# Patient Record
Sex: FEMALE | Race: ASIAN | NOT HISPANIC OR LATINO | Employment: FULL TIME | URBAN - METROPOLITAN AREA
[De-identification: names, ages, dates, MRNs, and addresses within clinical notes are randomized per-mention and may not be internally consistent; named-entity substitution may affect disease eponyms.]

---

## 2017-02-15 ENCOUNTER — TELEPHONE (OUTPATIENT)
Dept: FAMILY MEDICINE | Facility: CLINIC | Age: 37
End: 2017-02-15

## 2017-02-15 NOTE — TELEPHONE ENCOUNTER
Panel Management Review        Composite cancer screening  Chart review shows that this patient is due/due soon for the following Pap Smear  Summary:    Patient is due/failing the following:   PAP and PHYSICAL    Action needed:   Patient needs office visit for Physical.    Type of outreach:    Phone, spoke to patient.  Pt was made an appt for physical on 2/17/2017    Questions for provider review:    None                                                                                                                                    Ally, CMA

## 2017-03-31 ENCOUNTER — TELEPHONE (OUTPATIENT)
Dept: FAMILY MEDICINE | Facility: CLINIC | Age: 37
End: 2017-03-31

## 2017-03-31 ASSESSMENT — ANXIETY QUESTIONNAIRES
1. FEELING NERVOUS, ANXIOUS, OR ON EDGE: NEARLY EVERY DAY
7. FEELING AFRAID AS IF SOMETHING AWFUL MIGHT HAPPEN: NEARLY EVERY DAY
5. BEING SO RESTLESS THAT IT IS HARD TO SIT STILL: MORE THAN HALF THE DAYS
6. BECOMING EASILY ANNOYED OR IRRITABLE: MORE THAN HALF THE DAYS
2. NOT BEING ABLE TO STOP OR CONTROL WORRYING: MORE THAN HALF THE DAYS
3. WORRYING TOO MUCH ABOUT DIFFERENT THINGS: MORE THAN HALF THE DAYS
IF YOU CHECKED OFF ANY PROBLEMS ON THIS QUESTIONNAIRE, HOW DIFFICULT HAVE THESE PROBLEMS MADE IT FOR YOU TO DO YOUR WORK, TAKE CARE OF THINGS AT HOME, OR GET ALONG WITH OTHER PEOPLE: VERY DIFFICULT
GAD7 TOTAL SCORE: 16

## 2017-03-31 ASSESSMENT — PATIENT HEALTH QUESTIONNAIRE - PHQ9: 5. POOR APPETITE OR OVEREATING: MORE THAN HALF THE DAYS

## 2017-03-31 NOTE — TELEPHONE ENCOUNTER
Panel Management Review          Fail List measure:     Depression / Dysthymia review  PHQ-9 SCORE 10/21/2016 3/31/2017   Total Score 18 10      Patient is due for:  PHQ9      Patient is due/failing the following:   PHQ9    Action needed:   Patient needs to do PHQ9.    Type of outreach:    PHQ-9: 10  GAGE: 16  Questions for provider review:    None                                                                                                                                    Lisa Camarena CMA      Chart routed to Provider .

## 2017-04-01 ASSESSMENT — ANXIETY QUESTIONNAIRES: GAD7 TOTAL SCORE: 16

## 2017-04-01 ASSESSMENT — PATIENT HEALTH QUESTIONNAIRE - PHQ9: SUM OF ALL RESPONSES TO PHQ QUESTIONS 1-9: 10

## 2017-04-10 ENCOUNTER — OFFICE VISIT (OUTPATIENT)
Dept: FAMILY MEDICINE | Facility: CLINIC | Age: 37
End: 2017-04-10
Payer: COMMERCIAL

## 2017-04-10 VITALS
HEIGHT: 59 IN | DIASTOLIC BLOOD PRESSURE: 61 MMHG | HEART RATE: 52 BPM | TEMPERATURE: 98.6 F | WEIGHT: 110.2 LBS | BODY MASS INDEX: 22.22 KG/M2 | OXYGEN SATURATION: 99 % | SYSTOLIC BLOOD PRESSURE: 88 MMHG

## 2017-04-10 DIAGNOSIS — Z00.00 NORMAL PHYSICAL EXAM: Primary | ICD-10-CM

## 2017-04-10 DIAGNOSIS — G43.009 MIGRAINE WITHOUT AURA AND WITHOUT STATUS MIGRAINOSUS, NOT INTRACTABLE: ICD-10-CM

## 2017-04-10 DIAGNOSIS — Z12.4 SCREENING FOR MALIGNANT NEOPLASM OF CERVIX: ICD-10-CM

## 2017-04-10 DIAGNOSIS — Z11.3 SCREEN FOR STD (SEXUALLY TRANSMITTED DISEASE): ICD-10-CM

## 2017-04-10 DIAGNOSIS — F33.1 MAJOR DEPRESSIVE DISORDER, RECURRENT EPISODE, MODERATE (H): ICD-10-CM

## 2017-04-10 LAB
MICRO REPORT STATUS: NORMAL
SPECIMEN SOURCE: NORMAL
WET PREP SPEC: NORMAL

## 2017-04-10 PROCEDURE — 87210 SMEAR WET MOUNT SALINE/INK: CPT | Performed by: NURSE PRACTITIONER

## 2017-04-10 PROCEDURE — G0145 SCR C/V CYTO,THINLAYER,RESCR: HCPCS | Performed by: NURSE PRACTITIONER

## 2017-04-10 PROCEDURE — 87591 N.GONORRHOEAE DNA AMP PROB: CPT | Performed by: NURSE PRACTITIONER

## 2017-04-10 PROCEDURE — 99395 PREV VISIT EST AGE 18-39: CPT | Performed by: NURSE PRACTITIONER

## 2017-04-10 PROCEDURE — 99213 OFFICE O/P EST LOW 20 MIN: CPT | Mod: 25 | Performed by: NURSE PRACTITIONER

## 2017-04-10 PROCEDURE — 87624 HPV HI-RISK TYP POOLED RSLT: CPT | Performed by: NURSE PRACTITIONER

## 2017-04-10 PROCEDURE — 87491 CHLMYD TRACH DNA AMP PROBE: CPT | Performed by: NURSE PRACTITIONER

## 2017-04-10 ASSESSMENT — ANXIETY QUESTIONNAIRES
1. FEELING NERVOUS, ANXIOUS, OR ON EDGE: SEVERAL DAYS
3. WORRYING TOO MUCH ABOUT DIFFERENT THINGS: MORE THAN HALF THE DAYS
5. BEING SO RESTLESS THAT IT IS HARD TO SIT STILL: SEVERAL DAYS
6. BECOMING EASILY ANNOYED OR IRRITABLE: SEVERAL DAYS
7. FEELING AFRAID AS IF SOMETHING AWFUL MIGHT HAPPEN: SEVERAL DAYS
GAD7 TOTAL SCORE: 10
IF YOU CHECKED OFF ANY PROBLEMS ON THIS QUESTIONNAIRE, HOW DIFFICULT HAVE THESE PROBLEMS MADE IT FOR YOU TO DO YOUR WORK, TAKE CARE OF THINGS AT HOME, OR GET ALONG WITH OTHER PEOPLE: VERY DIFFICULT
2. NOT BEING ABLE TO STOP OR CONTROL WORRYING: MORE THAN HALF THE DAYS

## 2017-04-10 ASSESSMENT — PATIENT HEALTH QUESTIONNAIRE - PHQ9: 5. POOR APPETITE OR OVEREATING: MORE THAN HALF THE DAYS

## 2017-04-10 NOTE — LETTER
April 18, 2017    Kevin Crowe  [address] [facility] [address]  [place] [address]    Dear Kevin,  We are happy to inform you that your PAP smear result from 4/10/17 is normal.  We are now able to do a follow up test on PAP smears. The DNA test is for HPV (Human Papilloma Virus). Cervical cancer is closely linked with certain types of HPV. Your result showed no evidence of high risk HPV.  Therefore we recommend you return in 5 years for your next pap smear and HPV test.  You will still need to return to the clinic every year for an annual exam and other preventive tests.  Please contact the clinic at [phone] with any questions.  Sincerely,    SENIA Miller CNP/rlm

## 2017-04-10 NOTE — LETTER
[facility]   [address]  [facility] [address]      April 10, 2017      Kevin Crowe  [address]  [facility] [address]            Dear Kevin,    Your wet prep did not show a vaginal infection.  I'll let you know your other results as they become available.    Feel free to contact me with any questions or concerns.  Thank you for allowing me to participate in your care.    Pati Aguila APRN, CNP/ak        Results for orders placed or performed in visit on 04/10/17   Wet prep   Result Value Ref Range    Specimen Description Vagina     Wet Prep       No Trichomonas seen  No clue cells seen  No yeast seen  Scant material seen on sample submitted      Micro Report Status FINAL 04/10/2017

## 2017-04-10 NOTE — MR AVS SNAPSHOT
After Visit Summary   4/10/2017    Kevin Crowe    MRN: [id number]           Patient Information     Date Of Birth          1980        Visit Information        Provider Department      4/10/2017 9:40 AM Pati Aguila APRN CNP [facility]        Today's Diagnoses     Normal physical exam    -  1    Screening for malignant neoplasm of cervix        Migraine without aura and without status migrainosus, not intractable        Major depressive disorder, recurrent episode, moderate (H)        Screen for STD (sexually transmitted disease)          Care Instructions    Based on your medical history and these are the current health maintenance or preventive care services that you are due for (some may have been done at this visit)  Health Maintenance Due   Topic Date Due     PAP SCREENING Q3 YR (SYSTEM ASSIGNED)  09/09/1999         At [facility], we strive to deliver an exceptional experience to you, every time we see you.    If you receive a survey in the mail, please send us back your thoughts. We really do value your feedback.    Your care team's suggested websites for health information:  Www.GetYourGuide.org : Up to date and easily searchable information on multiple topics.  Www.medlineplus.gov : medication info, interactive tutorials, watch real surgeries online  Www.familydoctor.org : good info from the Academy of Family Physicians  Www.cdc.gov : public health info, travel advisories, epidemics (H1N1)  Www.aap.org : children's health info, normal development, vaccinations  Www.health.state.[place].us : [place] dept of health, public health issues in [place], N1N1    How to contact your care team:   Team Alexa/Spirit [phone]         Pharmacy [phone]    Dr. Chahal, Pushpa Parker PA-C, Dr. Mccrary, Anjana CONN CNP, Maryam Allen PA-C, Dr. Sainz, and SENIA Parikh CNP    Team RNs: Orquidea Leavitt      Clinic hours  M-Th 7 am-7 pm   Fri 7  am-5 pm.   Urgent care M-F 11 am-9 pm,   Sat/Sun 9 am-5 pm.  Pharmacy M-Th 8 am-8 pm Fri 8 am-6 pm  Sat/Sun 9 am-5 pm.     All password changes, disabled accounts, or ID changes in [email or web address] will be done by our Access Services Department.    If you need help with your account or password, call: [phone]. Clinic staff no longer has the ability to change passwords.     Preventive Health Recommendations  Female Ages 26 - 39  Yearly exam:   See your health care provider every year in order to    Review health changes.     Discuss preventive care.      Review your medicines if you your doctor has prescribed any.    Until age 30: Get a Pap test every three years (more often if you have had an abnormal result).    After age 30: Talk to your doctor about whether you should have a Pap test every 3 years or have a Pap test with HPV screening every 5 years.   You do not need a Pap test if your uterus was removed (hysterectomy) and you have not had cancer.  You should be tested each year for STDs (sexually transmitted diseases), if you're at risk.   Talk to your provider about how often to have your cholesterol checked.  If you are at risk for diabetes, you should have a diabetes test (fasting glucose).  Shots: Get a flu shot each year. Get a tetanus shot every 10 years.   Nutrition:     Eat at least 5 servings of fruits and vegetables each day.    Eat whole-grain bread, whole-wheat pasta and brown rice instead of white grains and rice.    Talk to your provider about Calcium and Vitamin D.     Lifestyle    Exercise at least 150 minutes a week (30 minutes a day, 5 days of the week). This will help you control your weight and prevent disease.    Limit alcohol to one drink per day.    No smoking.     Wear sunscreen to prevent skin cancer.    See your dentist every six months for an exam and cleaning.    Depression: Tips to Help Yourself  As your health care providers help treat your depression, you can also help  yourself. Keep in mind that your illness affects you emotionally, physically, mentally, and socially. So full recovery will take time. Take care of your body and your soul, and be patient with yourself as you get better.    Be with others  Don t isolate yourself--you ll only feel worse. Try to be with other people. And take part in fun activities when you can. Go to a movie, ballgame, Christian service, or social event. Talk openly with people you can trust. And accept help when it s offered.  Keep your perspective    Depression can cloud your judgment. So wait until you feel better before making major life decisions, such as changing jobs, moving, or getting  or .    This illness is not your fault. Don t blame yourself for your depression.    Recovering from depression is a process. Don t be discouraged if it takes some time to feel better.    Depression saps your energy and concentration. So you won t be able to do all the things you used to do. Set small goals and do what you can.  Take care of your body  People with depression often lose the desire to take care of themselves. That only makes their problems worse. During treatment and afterward, make a point to:    Exercise. It s a great way to take care of your body. And studies have shown that exercise helps fight depression.    Avoid drugs and alcohol. These may ease the pain in the short term. But they ll only make your problems worse in the long run.    Get relief from stress. Ask your healthcare provider for relaxation exercises and techniques to help relieve stress.    Eat right. A balanced and healthy diet helps keep your body healthy.    1375-0478 [employer]. [address] [place] [address], [place] [address]. All rights reserved. This information is not intended as a substitute for professional medical care. Always follow your healthcare professional's instructions.              Follow-ups after your visit        Additional Services  "    MENTAL HEALTH REFERRAL       Your provider has referred you to: [facility]: [facility] - Counseling (Individual/Couples/Family) - [facility] Lindsay Velez [phone]   [email or web address]   *Patient will be contacted by [facility] scheduling partner[facility]), to schedule an appointment.  Patients may also call [facility] to schedule.    All scheduling is subject to the client's specific insurance plan & benefits, provider/location availability, and provider clinical specialities.  Please arrive 15 minutes early for your first appointment and bring your completed paperwork.    Please be aware that coverage of these services is subject to the terms and limitations of your health insurance plan.  Call member services at your health plan with any benefit or coverage questions.                  Who to contact     If you have questions or need follow up information about today's clinic visit or your schedule please contact [facility] [place] directly at [phone].  Normal or non-critical lab and imaging results will be communicated to you by MyChart, letter or phone within 4 business days after the clinic has received the results. If you do not hear from us within 7 days, please contact the clinic through Netpulsehart or phone. If you have a critical or abnormal lab result, we will notify you by phone as soon as possible.  Submit refill requests through Twiigg or call your pharmacy and they will forward the refill request to us. Please allow 3 business days for your refill to be completed.          Additional Information About Your Visit        Twiigg Information     Twiigg lets you send messages to your doctor, view your test results, renew your prescriptions, schedule appointments and more. To sign up, go to [email or web address] . Click on \"Log in\" on the left side of the screen, which will take you to " "the Welcome page. Then click on \"Sign up Now\" on the right side of the page.     You will be asked to enter the access code listed below, as well as some personal information. Please follow the directions to create your username and password.     Your access code is: [email or web address]  Expires: 2017 10:31 AM     Your access code will  in 90 days. If you need help or a new code, please call your [facility] clinic or [phone].        Care EveryWhere ID     This is your Care EveryWhere ID. This could be used by other organizations to access your [facility] medical records  [id number]        Your Vitals Were     Pulse Temperature Height Last Period Pulse Oximetry BMI (Body Mass Index)    52 98.6  F (37  C) (Oral) 4' 11.25\" (1.505 m) 2017 99% 22.07 kg/m2       Blood Pressure from Last 3 Encounters:   04/10/17 (!) 88/61   10/21/16 96/64   01/29/15 101/70    Weight from Last 3 Encounters:   04/10/17 110 lb 3.2 oz (50 kg)   10/21/16 102 lb 3.2 oz (46.4 kg)   01/29/15 116 lb (52.6 kg)              We Performed the Following     CHLAMYDIA TRACHOMATIS PCR     MENTAL HEALTH REFERRAL     NEISSERIA GONORRHOEA PCR     Pap imaged thin layer screen with HPV - recommended age 30 - 65 years (select HPV order below)     Wet prep          Today's Medication Changes          These changes are accurate as of: 4/10/17 10:35 AM.  If you have any questions, ask your nurse or doctor.               Start taking these medicines.        Dose/Directions    sertraline 50 MG tablet   Commonly known as:  ZOLOFT   Used for:  Major depressive disorder, recurrent episode, moderate (H)   Started by:  Pati Aguila APRN CNP        Dose:  50 mg   Take 1 tablet (50 mg) by mouth daily   Quantity:  30 tablet   Refills:  1            Where to get your medicines      These medications were sent to [id number] - [place], [place] - [address]  [address], [facility] [address]     Phone:  [phone]     " sertraline 50 MG tablet                Primary Care Provider Office Phone # Fax #    SENIA Eden [phone]       [facility] [address]  [facility] [address]        Thank you!     Thank you for choosing [facility]  for your care. Our goal is always to provide you with excellent care. Hearing back from our patients is one way we can continue to improve our services. Please take a few minutes to complete the written survey that you may receive in the mail after your visit with us. Thank you!             Your Updated Medication List - Protect others around you: Learn how to safely use, store and throw away your medicines at www.disposemymeds.org.          This list is accurate as of: 4/10/17 10:35 AM.  Always use your most recent med list.                   Brand Name Dispense Instructions for use    order for DME     1 Device    Equipment being ordered: Donut       polyethylene glycol powder    MIRALAX    510 g    Take 17 g by mouth daily       sertraline 50 MG tablet    ZOLOFT    30 tablet    Take 1 tablet (50 mg) by mouth daily

## 2017-04-10 NOTE — NURSING NOTE
"Chief Complaint   Patient presents with     Physical     Fasting       Initial BP (!) 88/61 (BP Location: Left arm, Patient Position: Chair, Cuff Size: Adult Regular)  Pulse 52  Temp 98.6  F (37  C) (Oral)  Ht 4' 11.25\" (1.505 m)  Wt 110 lb 3.2 oz (50 kg)  LMP 03/27/2017  SpO2 99%  BMI 22.07 kg/m2 Estimated body mass index is 22.07 kg/(m^2) as calculated from the following:    Height as of this encounter: 4' 11.25\" (1.505 m).    Weight as of this encounter: 110 lb 3.2 oz (50 kg).  Medication Reconciliation: complete   Lisa Camarena MA      "

## 2017-04-10 NOTE — PATIENT INSTRUCTIONS
Based on your medical history and these are the current health maintenance or preventive care services that you are due for (some may have been done at this visit)  Health Maintenance Due   Topic Date Due     PAP SCREENING Q3 YR (SYSTEM ASSIGNED)  09/09/1999         At [facility], we strive to deliver an exceptional experience to you, every time we see you.    If you receive a survey in the mail, please send us back your thoughts. We really do value your feedback.    Your care team's suggested websites for health information:  Www."[employer].org : Up to date and easily searchable information on multiple topics.  Www.medlineplus.gov : medication info, interactive tutorials, watch real surgeries online  Www.familydoctor.org : good info from the Academy of Family Physicians  Www.cdc.gov : public health info, travel advisories, epidemics (H1N1)  Www.aap.org : children's health info, normal development, vaccinations  Www.health.[facility].[place].us : [place] dept of health, public health issues in [place], N1N1    How to contact your care team:   Team Alexa/Spirit [phone]         Pharmacy [phone]    Dr. Chahal, Pushpa Parker PA-C, Dr. Mccrary, Anjana CONN CNP, Maryam Allen PA-C, Dr. Sainz, and SENIA Parikh CNP    Team RNs: Orquidea & Dagmar      Clinic hours  M-Th 7 am-7 pm   Fri 7 am-5 pm.   Urgent care M-F 11 am-9 pm,   Sat/Sun 9 am-5 pm.  Pharmacy M-Th 8 am-8 pm Fri 8 am-6 pm  Sat/Sun 9 am-5 pm.     All password changes, disabled accounts, or ID changes in MyHealthTeams/MyHealth will be done by our Access Services Department.    If you need help with your account or password, call: [phone]. Clinic staff no longer has the ability to change passwords.     Preventive Health Recommendations  Female Ages 26 - 39  Yearly exam:   See your health care provider every year in order to    Review health changes.     Discuss preventive care.      Review your medicines if you your doctor has  prescribed any.    Until age 30: Get a Pap test every three years (more often if you have had an abnormal result).    After age 30: Talk to your doctor about whether you should have a Pap test every 3 years or have a Pap test with HPV screening every 5 years.   You do not need a Pap test if your uterus was removed (hysterectomy) and you have not had cancer.  You should be tested each year for STDs (sexually transmitted diseases), if you're at risk.   Talk to your provider about how often to have your cholesterol checked.  If you are at risk for diabetes, you should have a diabetes test (fasting glucose).  Shots: Get a flu shot each year. Get a tetanus shot every 10 years.   Nutrition:     Eat at least 5 servings of fruits and vegetables each day.    Eat whole-grain bread, whole-wheat pasta and brown rice instead of white grains and rice.    Talk to your provider about Calcium and Vitamin D.     Lifestyle    Exercise at least 150 minutes a week (30 minutes a day, 5 days of the week). This will help you control your weight and prevent disease.    Limit alcohol to one drink per day.    No smoking.     Wear sunscreen to prevent skin cancer.    See your dentist every six months for an exam and cleaning.    Depression: Tips to Help Yourself  As your health care providers help treat your depression, you can also help yourself. Keep in mind that your illness affects you emotionally, physically, mentally, and socially. So full recovery will take time. Take care of your body and your soul, and be patient with yourself as you get better.    Be with others  Don t isolate yourself--you ll only feel worse. Try to be with other people. And take part in fun activities when you can. Go to a movie, ballgame, Catholic service, or social event. Talk openly with people you can trust. And accept help when it s offered.  Keep your perspective    Depression can cloud your judgment. So wait until you feel better before making major life  decisions, such as changing jobs, moving, or getting  or .    This illness is not your fault. Don t blame yourself for your depression.    Recovering from depression is a process. Don t be discouraged if it takes some time to feel better.    Depression saps your energy and concentration. So you won t be able to do all the things you used to do. Set small goals and do what you can.  Take care of your body  People with depression often lose the desire to take care of themselves. That only makes their problems worse. During treatment and afterward, make a point to:    Exercise. It s a great way to take care of your body. And studies have shown that exercise helps fight depression.    Avoid drugs and alcohol. These may ease the pain in the short term. But they ll only make your problems worse in the long run.    Get relief from stress. Ask your healthcare provider for relaxation exercises and techniques to help relieve stress.    Eat right. A balanced and healthy diet helps keep your body healthy.    0124-8391 [employer]. [address] [place] [address], [place] [address]. All rights reserved. This information is not intended as a substitute for professional medical care. Always follow your healthcare professional's instructions.

## 2017-04-10 NOTE — LETTER
[address] [address]  [phone]    April 11, 2017    Kevin Crowe  [address] [facility] [address]    Hi Kevin,     Your wet prep, chlamydia and gonorrhea screens were negative (normal).Feel free to contact me with any questions or concerns.  Thank you for allowing me to participate in your care.     Pati Aguila APRN, CNP/smj    Results for orders placed or performed in visit on 04/10/17   Wet prep   Result Value Ref Range    Specimen Description Vagina     Wet Prep       No Trichomonas seen  No clue cells seen  No yeast seen  Scant material seen on sample submitted      Micro Report Status FINAL 04/10/2017    NEISSERIA GONORRHOEA PCR   Result Value Ref Range    Specimen Descrip Endocervical     N Gonorrhea PCR  NEG     Negative   Negative for N. gonorrhoeae rRNA by transcription mediated amplification.   A negative result by transcription mediated amplification does not preclude the   presence of N. gonorrhoeae infection because results are dependent on proper   and adequate collection, absence of inhibitors, and sufficient rRNA to be   detected.     CHLAMYDIA TRACHOMATIS PCR   Result Value Ref Range    Specimen Description Endocervical     Chlamydia Trachomatis PCR  NEG     Negative   Negative for C. trachomatis rRNA by transcription mediated amplification.   A negative result by transcription mediated amplification does not preclude the   presence of C. trachomatis infection because results are dependent on proper   and adequate collection, absence of inhibitors, and sufficient rRNA to be   detected.

## 2017-04-10 NOTE — PROGRESS NOTES
SUBJECTIVE:     CC: Kevin Crowe is an 36 year old woman who presents for preventive health visit.     Healthy Habits:    Do you get at least three servings of calcium containing foods daily (dairy, green leafy vegetables, etc.)? yes    Amount of exercise or daily activities, outside of work: 7 day(s) per week    Problems taking medications regularly No    Medication side effects: No    Have you had an eye exam in the past two years? no    Do you see a dentist twice per year? yes    Do you have sleep apnea, excessive snoring or daytime drowsiness?no        PROBLEMS TO ADD ON- Difficulty sleeping, takes 4 hours to get to sleep, sleeps from 0157-0261, naps from 7910-3386, sleep is nonrestorative.  She was recently changed from evening to day shift which has mixed up her sleep.  She is no longer exercising and thinks this has contributed to her sleep issues. She denies recent change in stress level, no change in homelife.    Migraines- having a migraine headache almost weekly, treats with Caffeine with good symptoms relief, also uses tylenol, Pamprin, and Midol around her menses with good relief.  Headaches are worse when she is stressed.    Depression- worse recently, no specific triggers/events, PHQ-9=10, GAGE-7=11.  No Si/HI, patient has little energy for other than work and maintaining her home.  She is not socializing much but states she's never been real social,watches more TV than usual, not crying more than usual but is more irritable.  Today's PHQ-2 Score:   PHQ-2 ( 1999 [employer]) 4/10/2017 4/18/2014   Q1: Little interest or pleasure in doing things 1 0   Q2: Feeling down, depressed or hopeless 1 0   PHQ-2 Score 2 0       Abuse: Current or Past(Physical, Sexual or Emotional)- No  Do you feel safe in your environment - Yes    Social History   Substance Use Topics     Smoking status: Never Smoker     Smokeless tobacco: Never Used     Alcohol use No     The patient does not drink >3 drinks per day nor >7 drinks per  week.    No results for input(s): CHOL, HDL, LDL, TRIG, CHOLHDLRATIO, NHDL in the last 23669 hours.    Reviewed orders with patient.  Reviewed health maintenance and updated orders accordingly - Yes    Mammo Decision Support:  Mammogram not appropriate for this patient based on age.    Pertinent mammograms are reviewed under the imaging tab.  History of abnormal Pap smear: NO - age 30-65 PAP every 5 years with negative HPV co-testing recommended    Reviewed and updated as needed this visit by clinical staff  Tobacco  Allergies  Meds  Med Hx  Surg Hx  Fam Hx  Soc Hx        Reviewed and updated as needed this visit by Provider        Past Medical History:   Diagnosis Date     Headache(784.0)     Off and on for 6 years     Menstrual migraine 4/28/2014     Migraine without aura 4/28/2014      Past Surgical History:   Procedure Laterality Date     GYN SURGERY  2008, 2002    C-sections       ROS:  C: NEGATIVE for fever, chills, change in weight  I: NEGATIVE for worrisome rashes, moles or lesions  E: NEGATIVE for vision changes or irritation  ENT: NEGATIVE for ear, mouth and throat problems  R: NEGATIVE for significant cough or SOB  B: NEGATIVE for masses, tenderness or discharge  CV: NEGATIVE for chest pain, palpitations or peripheral edema  GI: NEGATIVE for nausea, abdominal pain, heartburn, or change in bowel habits  : NEGATIVE for unusual urinary or vaginal symptoms. Periods are regular.  M: NEGATIVE for significant arthralgias or myalgia  N: NEGATIVE for weakness, dizziness or paresthesias  P: NEGATIVE for changes in mood or affect    Problem list, Medication list, Allergies, and Medical/Social/Surgical histories reviewed in [facility] and updated as appropriate.  Labs reviewed in EPIC  BP Readings from Last 3 Encounters:   04/10/17 (!) 88/61   10/21/16 96/64   01/29/15 101/70    Wt Readings from Last 3 Encounters:   04/10/17 110 lb 3.2 oz (50 kg)   10/21/16 102 lb 3.2 oz (46.4 kg)   01/29/15 116 lb (52.6 kg)     "              OBJECTIVE:     BP (!) 88/61 (BP Location: Left arm, Patient Position: Chair, Cuff Size: Adult Regular)  Pulse 52  Temp 98.6  F (37  C) (Oral)  Ht 4' 11.25\" (1.505 m)  Wt 110 lb 3.2 oz (50 kg)  LMP 03/27/2017  SpO2 99%  BMI 22.07 kg/m2  EXAM:  GENERAL: healthy, alert and no distress  EYES: Eyes grossly normal to inspection, PERRL and conjunctivae and sclerae normal  HENT: ear canals and TM's normal, nose and mouth without ulcers or lesions  NECK: no adenopathy, no asymmetry, masses, or scars and thyroid normal to palpation  RESP: lungs clear to auscultation - no rales, rhonchi or wheezes  BREAST: normal without masses, tenderness or nipple discharge and no palpable axillary masses or adenopathy  CV: regular rate and rhythm, normal S1 S2, no S3 or S4, no murmur, click or rub, no peripheral edema and peripheral pulses strong  ABDOMEN: soft, nontender, no hepatosplenomegaly, no masses and bowel sounds normal   (female): normal female external genitalia, normal urethral meatus, vaginal mucosa pink, moist, well rugated, and normal cervix/adnexa/uterus without masses or discharge, pap, wet prep, G & C obtained  MS: no gross musculoskeletal defects noted, no edema  SKIN: no suspicious lesions or rashes  NEURO: Normal strength and tone, mentation intact and speech normal  PSYCH: mentation appears normal, affect normal/bright  LYMPH: no cervical, supraclavicular, axillary, or inguinal adenopathy    ASSESSMENT/PLAN:     1. Normal physical exam      2. Screening for malignant neoplasm of cervix    - Pap imaged thin layer screen with HPV - recommended age 30 - 65 years (select HPV order below)    3. Migraine without aura and without status migrainosus, not intractable  MAP reviewed.     Wediscussed the pathophysiology of migraine headache, including triggers and the various treatment options.  Patient was given educational materials on migraine headaches.   She states she has tried several prescription " migraine medications but that none of them have helped.  Taking some caffeine at the headache onset seems to help the most.     Recommendations: lie in darkened room and apply cold packs prn for pain, episodic therapy with  Excedrin Migraine due to low frequency of pain, side effect profile discussed in detail, asked to keep headache diary and patient reassured that neurodiagnostic workup not indicated from benign H&P.  See orders in EpicCare.    Follow-up if headache persists despite these treatments, or if symptoms change.     4. Major depressive disorder, recurrent episode, moderate (H)  PHQ-9=11  , GAGE-7= 10 . Patient is  interested in medication management for her depression, andis she interested in counseling as well. Referral placed.  Discussed need for gradual increase of SSRI dose over time, titrating to effect.  Reviewed potential for initial side effects (such as headache, GI symptoms, and dry mouth) that will likely subside after a week or so, but that therapeutic effects will likely take 1-2 weeks - so it's important to stick with medication for at least a month to adequately gauge effect.  Notify me of any significant side effects.  Discussed that treatment with SSRI medications requires a minimum commitment of 9-12 months; shorter courses are associated with rebound symptoms.  Discussed potential long-term side effects including sexual side effects, return to clinic 3 weeks for medication check, sooner if new/worsening concerns.    5 Screen for STD (sexually transmitted disease)    - Wet prep  - NEISSERIA GONORRHOEA PCR  - CHLAMYDIA TRACHOMATIS PCR          COUNSELING:   Reviewed preventive health counseling, as reflected in patient instructions       Regular exercise       Healthy diet/nutrition       Vision screening       Contraception       Osteoporosis Prevention/Bone Health       Safe sex practices/STD prevention         reports that she has never smoked. She has never used smokeless  "tobacco.    Estimated body mass index is 22.07 kg/(m^2) as calculated from the following:    Height as of this encounter: 4' 11.25\" (1.505 m).    Weight as of this encounter: 110 lb 3.2 oz (50 kg).       Counseling Resources:  ATP IV Guidelines  Pooled Cohorts Equation Calculator  Breast Cancer Risk Calculator  FRAX Risk Assessment  ICSI Preventive Guidelines  Dietary Guidelines for Americans, 2010  USDA's MyPlate  ASA Prophylaxis  Lung CA Screening    [facility]  "

## 2017-04-10 NOTE — LETTER
My Migraine Action Plan      Date: 4/10/2017     My Name: Kevin Crowe   YOB: 1980  My Pharmacy:    [id number] - [facility], [place] - [address] [place] [address] PHARMACY #[id number] - [facility], [place] - [address] [facility]  [facility] # [id number] - [place], [place] - [address]       My (Preventative) Control Medicine: NONE        My Rescue Medicine: tylenol, Pamprin or Midol   My Doctor: Pati Aguila     My Clinic: [address] [id number] [phone]        GREEN ZONE = Good Control    My headache plan is working.   I can do what I need to do.           I WILL:     ? Keep managing my triggers.  ? Write in my migraine diary each time I have a headache.  ? Keep taking my preventive (controller) medicine daily.  ? Take my relief and rescue medicine as needed.             YELLOW ZONE = Not Enough Control    My headache plan isn t always working.   My headaches keep me from doing   some of the things I need to do.       I WILL:     ? Set goals to control my triggers and act on them.  ? Write in my migraine diary each time I have a headache and review it for                      patterns or new triggers.  ? Keep taking my preventive (controller) medicine daily.  ? Take my relief and rescue medicine as needed.  ? Call my doctor or clinic at if I stay in the Yellow Zone.             RED ZONE = Poor or No Control    My headache plan has  failed. I can t do anything  when I have one. My  medicines aren t working.           I WILL:   ? Set goals to control my triggers and act on them.  ? Write in my migraine diary each time I have a headache and review it for                      patterns or new triggers.  ? Keep taking my preventive (controller) medicine daily.  ? Take my relief and rescue medicine as needed.  ? Call my doctor or clinic or go to urgent care or an ER if I m having the worst                  headache of my life.  ? Call  my doctor or clinic or go to urgent care or an ER if my medicine doesn t work.  ? Let my doctor or clinic know within 2 weeks if I have gone to an urgent care or             emergency department.          Provider specific instructions:

## 2017-04-11 LAB
C TRACH DNA SPEC QL NAA+PROBE: NORMAL
N GONORRHOEA DNA SPEC QL NAA+PROBE: NORMAL
SPECIMEN SOURCE: NORMAL
SPECIMEN SOURCE: NORMAL

## 2017-04-11 ASSESSMENT — ANXIETY QUESTIONNAIRES: GAD7 TOTAL SCORE: 10

## 2017-04-11 ASSESSMENT — PATIENT HEALTH QUESTIONNAIRE - PHQ9: SUM OF ALL RESPONSES TO PHQ QUESTIONS 1-9: 11

## 2017-04-12 LAB
COPATH REPORT: NORMAL
PAP: NORMAL

## 2017-04-14 LAB
FINAL DIAGNOSIS: NORMAL
HPV HR 12 DNA CVX QL NAA+PROBE: NEGATIVE
HPV16 DNA SPEC QL NAA+PROBE: NEGATIVE
HPV18 DNA SPEC QL NAA+PROBE: NEGATIVE
SPECIMEN DESCRIPTION: NORMAL

## 2017-05-24 ENCOUNTER — TELEPHONE (OUTPATIENT)
Dept: FAMILY MEDICINE | Facility: CLINIC | Age: 37
End: 2017-05-24

## 2017-05-25 NOTE — TELEPHONE ENCOUNTER
What type of form? Request for Leave of Absence  What day did you drop off your forms? 05/24/2017  Is there a due date? 05/26/2017 (7-10 business day to compete forms)   How would you like to receive these forms? Patient will  at the clinic when completed    What is the best number to contact you? Home [phone]  What time works best to contact you with in 4 hrs? Any  Is it okay to leave a message? Yes    Eileen Johnston

## 2017-05-25 NOTE — TELEPHONE ENCOUNTER
Received forms and placed in Gena's office for review.  Georgina Headley MA/  For [employer] and [employer]

## 2017-05-26 NOTE — TELEPHONE ENCOUNTER
Pls have patient schedule appt so we can discuss specifics of her FMLA request for migraines. She is due for follow up of her depression as well.  Pati CONN, CNP

## 2017-05-30 NOTE — TELEPHONE ENCOUNTER
Called and spoke to patient and scheduled an appointment for tomorrow  5/31/17 at 7:00 am.  Georgina Headley MA/  For [employer]

## 2017-05-31 ENCOUNTER — OFFICE VISIT (OUTPATIENT)
Dept: FAMILY MEDICINE | Facility: CLINIC | Age: 37
End: 2017-05-31
Payer: COMMERCIAL

## 2017-05-31 VITALS
SYSTOLIC BLOOD PRESSURE: 90 MMHG | WEIGHT: 112.8 LBS | HEART RATE: 44 BPM | HEIGHT: 59 IN | BODY MASS INDEX: 22.74 KG/M2 | DIASTOLIC BLOOD PRESSURE: 57 MMHG | TEMPERATURE: 97.1 F | OXYGEN SATURATION: 99 %

## 2017-05-31 DIAGNOSIS — G43.009 MIGRAINE WITHOUT AURA AND WITHOUT STATUS MIGRAINOSUS, NOT INTRACTABLE: ICD-10-CM

## 2017-05-31 DIAGNOSIS — Z13.6 CARDIOVASCULAR SCREENING; LDL GOAL LESS THAN 160: ICD-10-CM

## 2017-05-31 DIAGNOSIS — F33.1 MAJOR DEPRESSIVE DISORDER, RECURRENT EPISODE, MODERATE (H): Primary | ICD-10-CM

## 2017-05-31 DIAGNOSIS — K59.00 CONSTIPATION, UNSPECIFIED CONSTIPATION TYPE: ICD-10-CM

## 2017-05-31 LAB
GLUCOSE SERPL-MCNC: 89 MG/DL (ref 70–99)
LDLC SERPL DIRECT ASSAY-MCNC: 100 MG/DL
TSH SERPL DL<=0.005 MIU/L-ACNC: 3.72 MU/L (ref 0.4–4)

## 2017-05-31 PROCEDURE — 83721 ASSAY OF BLOOD LIPOPROTEIN: CPT | Performed by: NURSE PRACTITIONER

## 2017-05-31 PROCEDURE — 36415 COLL VENOUS BLD VENIPUNCTURE: CPT | Performed by: NURSE PRACTITIONER

## 2017-05-31 PROCEDURE — 82947 ASSAY GLUCOSE BLOOD QUANT: CPT | Performed by: NURSE PRACTITIONER

## 2017-05-31 PROCEDURE — 99214 OFFICE O/P EST MOD 30 MIN: CPT | Performed by: NURSE PRACTITIONER

## 2017-05-31 PROCEDURE — 84443 ASSAY THYROID STIM HORMONE: CPT | Performed by: NURSE PRACTITIONER

## 2017-05-31 RX ORDER — POLYETHYLENE GLYCOL 3350 17 G/17G
1 POWDER, FOR SOLUTION ORAL DAILY
Qty: 510 G | Refills: 1 | Status: SHIPPED | OUTPATIENT
Start: 2017-05-31 | End: 2019-06-18

## 2017-05-31 ASSESSMENT — ANXIETY QUESTIONNAIRES
IF YOU CHECKED OFF ANY PROBLEMS ON THIS QUESTIONNAIRE, HOW DIFFICULT HAVE THESE PROBLEMS MADE IT FOR YOU TO DO YOUR WORK, TAKE CARE OF THINGS AT HOME, OR GET ALONG WITH OTHER PEOPLE: SOMEWHAT DIFFICULT
2. NOT BEING ABLE TO STOP OR CONTROL WORRYING: SEVERAL DAYS
5. BEING SO RESTLESS THAT IT IS HARD TO SIT STILL: NOT AT ALL
GAD7 TOTAL SCORE: 6
3. WORRYING TOO MUCH ABOUT DIFFERENT THINGS: SEVERAL DAYS
7. FEELING AFRAID AS IF SOMETHING AWFUL MIGHT HAPPEN: SEVERAL DAYS
1. FEELING NERVOUS, ANXIOUS, OR ON EDGE: SEVERAL DAYS
6. BECOMING EASILY ANNOYED OR IRRITABLE: SEVERAL DAYS

## 2017-05-31 ASSESSMENT — PATIENT HEALTH QUESTIONNAIRE - PHQ9: 5. POOR APPETITE OR OVEREATING: SEVERAL DAYS

## 2017-05-31 NOTE — LETTER
My Migraine Action Plan      Date: 5/31/2017     My Name: Kevin Crowe   YOB: 1980  My Pharmacy:    [id number] - [facility], [place] - [address] [place] [address] [facility]ARMACY #[id number] - [facility], [place] - [address] [facility] # [id number] - [place], [place] - [address] [address]       My (Preventative) Control Medicine: none        My Rescue Medicine: Excedrin Migraine   My Doctor: Pati Aguila     My Clinic: [address] [address] [phone]        GREEN ZONE = Good Control    My headache plan is working.   I can do what I need to do.           I WILL:     ? Keep managing my triggers.  ? Write in my migraine diary each time I have a headache.  ? Keep taking my preventive (controller) medicine daily.  ? Take my relief and rescue medicine as needed.             YELLOW ZONE = Not Enough Control    My headache plan isn t always working.   My headaches keep me from doing   some of the things I need to do.       I WILL:     ? Set goals to control my triggers and act on them.  ? Write in my migraine diary each time I have a headache and review it for                      patterns or new triggers.  ? Keep taking my preventive (controller) medicine daily.  ? Take my relief and rescue medicine as needed.  ? Call my doctor or clinic at if I stay in the Yellow Zone.             RED ZONE = Poor or No Control    My headache plan has  failed. I can t do anything  when I have one. My  medicines aren t working.           I WILL:   ? Set goals to control my triggers and act on them.  ? Write in my migraine diary each time I have a headache and review it for                      patterns or new triggers.  ? Keep taking my preventive (controller) medicine daily.  ? Take my relief and rescue medicine as needed.  ? Call my doctor or clinic or go to urgent care or an ER if I m having the worst                  headache of my life.  ? Call my doctor  or clinic or go to urgent care or an ER if my medicine doesn t work.  ? Let my doctor or clinic know within 2 weeks if I have gone to an urgent care or             emergency department.          Provider specific instructions:

## 2017-05-31 NOTE — NURSING NOTE
"Chief Complaint   Patient presents with     Forms     FMLA       Initial BP 90/57 (BP Location: Left arm, Patient Position: Chair, Cuff Size: Adult Regular)  Pulse (!) 44  Temp 97.1  F (36.2  C) (Oral)  Ht 4' 11.25\" (1.505 m)  Wt 112 lb 12.8 oz (51.2 kg)  SpO2 99%  BMI 22.59 kg/m2 Estimated body mass index is 22.59 kg/(m^2) as calculated from the following:    Height as of this encounter: 4' 11.25\" (1.505 m).    Weight as of this encounter: 112 lb 12.8 oz (51.2 kg).  Medication Reconciliation: complete   Lisa Camarena MA      "

## 2017-05-31 NOTE — MR AVS SNAPSHOT
After Visit Summary   5/31/2017    Kevin Crowe    MRN: [id number]           Patient Information     Date Of Birth          1980        Visit Information        Provider Department      5/31/2017 7:00 AM Pati Aguila APRN CNP [facility]        Today's Diagnoses     Major depressive disorder, recurrent episode, moderate (H)    -  1    Migraine without aura and without status migrainosus, not intractable        CARDIOVASCULAR SCREENING; LDL GOAL LESS THAN 160          Care Instructions    Based on your medical history and these are the current health maintenance or preventive care services that you are due for (some may have been done at this visit)  Health Maintenance Due   Topic Date Due     MIGRAINE ACTION PLAN  09/09/1996         At [facility], we strive to deliver an exceptional experience to you, every time we see you.    If you receive a survey in the mail, please send us back your thoughts. We really do value your feedback.    Your care team's suggested websites for health information:  Www.[employer] : Up to date and easily searchable information on multiple topics.  Www.medlineplus.gov : medication info, interactive tutorials, watch real surgeries online  Www.familydoctor.org : good info from the Academy of Family Physicians  Www.cdc.gov : public health info, travel advisories, epidemics (H1N1)  Www.aap.org : children's health info, normal development, vaccinations  Www.health.state[place] : [place] dept of health, public health issues in [place], N1N1    How to contact your care team:   Team Alexa/Spirit [phone]         Pharmacy [phone]    Dr. Chahal, Pushpa Parker PA-C, Dr. Mccrary, Anjana CONN CNP, Maryam Allen PA-C, Dr. Sainz, and SENIA Parikh CNP    Team RNs: Orquidea & Dagmar      Clinic hours  M-Th 7 am-7 pm   Fri 7 am-5 pm.   Urgent care M-F 11 am-9 pm,   Sat/Sun 9 am-5 pm.  Pharmacy M-Th 8 am-8 pm Fri 8  am-6 pm  Sat/Sun 9 am-5 pm.     All password changes, disabled accounts, or ID changes in [employer]/MyHealth will be done by our Access Services Department.    If you need help with your account or password, call: [phone]. Clinic staff no longer has the ability to change passwords.     Depression: Tips to Help Yourself  As your health care providers help treat your depression, you can also help yourself. Keep in mind that your illness affects you emotionally, physically, mentally, and socially. So full recovery will take time. Take care of your body and your soul, and be patient with yourself as you get better.    Be with others  Don t isolate yourself--you ll only feel worse. Try to be with other people. And take part in fun activities when you can. Go to a movie, ballgame, Synagogue service, or social event. Talk openly with people you can trust. And accept help when it s offered.  Keep your perspective    Depression can cloud your judgment. So wait until you feel better before making major life decisions, such as changing jobs, moving, or getting  or .    This illness is not your fault. Don t blame yourself for your depression.    Recovering from depression is a process. Don t be discouraged if it takes some time to feel better.    Depression saps your energy and concentration. So you won t be able to do all the things you used to do. Set small goals and do what you can.  Take care of your body  People with depression often lose the desire to take care of themselves. That only makes their problems worse. During treatment and afterward, make a point to:    Exercise. It s a great way to take care of your body. And studies have shown that exercise helps fight depression.    Avoid drugs and alcohol. These may ease the pain in the short term. But they ll only make your problems worse in the long run.    Get relief from stress. Ask your healthcare provider for relaxation exercises and techniques to  help relieve stress.    Eat right. A balanced and healthy diet helps keep your body healthy.    7163-3936 [employer]. [address] [place] [address], [place] [address]. All rights reserved. This information is not intended as a substitute for professional medical care. Always follow your healthcare professional's instructions.        Counseling for Depression  Counseling, also called talk therapy, has been found to be as effective as medication in treating mild to moderate depression. When done by a trained professional, this treatment is a powerful way to better understand your thoughts and feelings. Like medications, it may take time before you notice how much counseling is helping.    Kinds of talk therapy  Different counselors use different methods for talk therapy. But all therapy aims to help change how you think about your problem. Therapy for depression is often done one-on-one. But it may also be done in a group setting. You and your healthcare provider can discuss the type of therapy you think would work best for you. You can also discuss who the best person is to provide the therapy.  How therapy helps  Talking about your problems can help them seem less overwhelming. It can help work through problems you have with your life and your relationships. It can also help you understand how depression is clouding your thinking, not letting you see the world the way it really is. Therapy can give you:    Insight about your emotions.    New tools for dealing with your problems.    Emotional support for making progress.  Getting better takes time  Talk therapy will help you feel better. But change doesn t happen right away. Depression takes away your energy and motivation. So it can be hard to feel like going to therapy and sticking with it. But therapy has been proven to be very valuable in the treatment of depression. Therapy for depression is often done for a set number of sessions. In other cases, you  and your therapist decide together at what point you no longer need therapy.  Additional sources of help  In addition to a professional counselor, it may help to talk to other people in your life. You may find support and insight from:    A close friend or family member.    A , , or rabbi trained in counseling.    A local support group or community group.    A 12-step program (such as Alcoholics Anonymous) for dealing with problems that can contribute to depression, such as alcohol or drug addiction.    6896-8071 [employer]. [address] [place] [address]. All rights reserved. This information is not intended as a substitute for professional medical care. Always follow your healthcare professional's instructions.                Follow-ups after your visit        Additional Services     MENTAL HEALTH REFERRAL       Your provider has referred you to: [facility]: [facility] - Counseling (Individual/Couples/Family) - [facility] Lindsay Velez [phone]   [email or web address]   *Patient will be contacted by [place]'s scheduling partner, [facility] ([facility]), to schedule an appointment.  Patients may also call [facility] to schedule.    All scheduling is subject to the client's specific insurance plan & benefits, provider/location availability, and provider clinical specialities.  Please arrive 15 minutes early for your first appointment and bring your completed paperwork.    Please be aware that coverage of these services is subject to the terms and limitations of your health insurance plan.  Call member services at your health plan with any benefit or coverage questions.                  Who to contact     If you have questions or need follow up information about today's clinic visit or your schedule please contact [facility] VELE[facility] directly at [phone].  Normal or non-critical  "lab and imaging results will be communicated to you by MyChart, letter or phone within 4 business days after the clinic has received the results. If you do not hear from us within 7 days, please contact the clinic through [employer] or phone. If you have a critical or abnormal lab result, we will notify you by phone as soon as possible.  Submit refill requests through [employer] or call your pharmacy and they will forward the refill request to us. Please allow 3 business days for your refill to be completed.          Additional Information About Your Visit        [employer] Information     [employer] lets you send messages to your doctor, view your test results, renew your prescriptions, schedule appointments and more. To sign up, go to [email or web address] . Click on \"Log in\" on the left side of the screen, which will take you to the Welcome page. Then click on \"Sign up Now\" on the right side of the page.     You will be asked to enter the access code listed below, as well as some personal information. Please follow the directions to create your username and password.     Your access code is: [email or web address]  Expires: 2017 10:31 AM     Your access code will  in 90 days. If you need help or a new code, please call your [place] clinic or [phone].        Care EveryWhere ID     This is your Care EveryWhere ID. This could be used by other organizations to access your [place] medical records  [id number]        Your Vitals Were     Pulse Temperature Height Pulse Oximetry BMI (Body Mass Index)       44 97.1  F (36.2  C) (Oral) 4' 11.25\" (1.505 m) 99% 22.59 kg/m2        Blood Pressure from Last 3 Encounters:   17 90/57   04/10/17 (!) 88/61   10/21/16 96/64    Weight from Last 3 Encounters:   17 112 lb 12.8 oz (51.2 kg)   04/10/17 110 lb 3.2 oz (50 kg)   10/21/16 102 lb 3.2 oz (46.4 kg)              We Performed the Following     Glucose     LDL cholesterol direct     MENTAL HEALTH REFERRAL     TSH with " free T4 reflex        Primary Care Provider Office Phone # Fax #    SENIA Eden [phone]       [facility] [address]  [facility] [address]        Thank you!     Thank you for choosing [facility]  for your care. Our goal is always to provide you with excellent care. Hearing back from our patients is one way we can continue to improve our services. Please take a few minutes to complete the written survey that you may receive in the mail after your visit with us. Thank you!             Your Updated Medication List - Protect others around you: Learn how to safely use, store and throw away your medicines at www.disposemymeds.org.          This list is accurate as of: 5/31/17  7:45 AM.  Always use your most recent med list.                   Brand Name Dispense Instructions for use    order for DME     1 Device    Equipment being ordered: Donut       polyethylene glycol powder    MIRALAX    510 g    Take 17 g by mouth daily       sertraline 50 MG tablet    ZOLOFT    30 tablet    Take 1 tablet (50 mg) by mouth daily

## 2017-05-31 NOTE — PROGRESS NOTES
"  SUBJECTIVE:                                                    Kevin Crowe is a 36 year old female who presents to clinic today for the following health issues:      Concern: [employer] forms for work. Pt states that her symptoms for IBS is getting worse, she is experiencing more dizziness, extreme headaches, and vomiting.     Patient has not been diagnosed with IBS.  She missed 1 week of work 2 weeks ago due to fever, dizziness, nausea.  Son had similar symptoms the week prior.  She comes in requesting [place] paperwork for IBS, headaches, vomiting.  As welll, her depression has worsened.  She stopped the Zoloft after less than 2 week trial.  States she felt nauseated, had charley horse in her right foot only, \"didn't feel well,\" not getting along with co workers, \"HR at work is against me, everyone is against me.\"   Symptoms improved once she stopped the Zoloft.  She has never seen a counselor for her depression.    Constipation     Onset: years    Description:   Frequency of bowel movements: 3 days.  Stool consistency: hard    Progression of Symptoms:  same    Accompanying Signs & Symptoms:  Abdominal pain (cramping?): no   Blood in stool: no   Rectal pain: no   Nausea/vomiting: YES  Weight loss or gain: no    History:   History of abdominal surgery: YES- c sections    Precipitating factors:   Recent use of narcotics, anticholinergics, calcium channel blockers, antacids, or iron supplements: no   Chronic Laxative Use: no          Therapies Tried and outcome: change in diet, increase in fluids and chinese \"diet tea\" which helps with constipation. NO diarrhea, abdominal pain, bloating.  She drinks plenty of water, consumes diet high in carbohydrates but not a lot of fiber.  She does not exercise regularly.    Migraines- has history of menstrual migraine for which she has taken  Naproxen, Imitrex, Pamprin in the past.  All have worked but she does not take them regularly.  Headaches are frontal and occipital, throbbing, " "relieved with medications, admits that headaches are worse when she is stressed.  She denies missing work recently due to headache- symptoms were for dizziness, nausea, and vomiting which has since resolved.  Symptoms are worse when at work and states symptoms rarely occur when she is at home.    Problem list and histories reviewed & adjusted, as indicated.  Additional history: as documented    BP Readings from Last 3 Encounters:   05/31/17 90/57   04/10/17 (!) 88/61   10/21/16 96/64    Wt Readings from Last 3 Encounters:   05/31/17 112 lb 12.8 oz (51.2 kg)   04/10/17 110 lb 3.2 oz (50 kg)   10/21/16 102 lb 3.2 oz (46.4 kg)                  Labs reviewed in EPIC    Reviewed and updated as needed this visit by clinical staff       Reviewed and updated as needed this visit by Provider         ROS:  Constitutional, HEENT, cardiovascular, pulmonary, gi and gu systems are negative, except as otherwise noted.    OBJECTIVE:                                                    BP 90/57 (BP Location: Left arm, Patient Position: Chair, Cuff Size: Adult Regular)  Pulse (!) 44  Temp 97.1  F (36.2  C) (Oral)  Ht 4' 11.25\" (1.505 m)  Wt 112 lb 12.8 oz (51.2 kg)  SpO2 99%  BMI 22.59 kg/m2  Body mass index is 22.59 kg/(m^2).  GENERAL: healthy, alert and no distress  EYES: Eyes grossly normal to inspection, PERRL and conjunctivae and sclerae normal  HENT: ear canals and TM's normal, nose and mouth without ulcers or lesions  NECK: no adenopathy, no asymmetry, masses, or scars and thyroid normal to palpation  RESP: lungs clear to auscultation - no rales, rhonchi or wheezes  CV: regular rate and rhythm, normal S1 S2, no S3 or S4, no murmur, click or rub, no peripheral edema and peripheral pulses strong  ABDOMEN: soft, nontender, no hepatosplenomegaly, no masses and bowel sounds normal  MS: no gross musculoskeletal defects noted, no edema  SKIN: no suspicious lesions or rashes  NEURO: Normal strength and tone, mentation intact and " "speech normal  PSYCH: mentation appears normal, affect flat and appearance well groomed    Diagnostic Test Results:  No results found for this or any previous visit (from the past 24 hour(s)).     ASSESSMENT/PLAN:                                                          BMI:   Estimated body mass index is 22.59 kg/(m^2) as calculated from the following:    Height as of this encounter: 4' 11.25\" (1.505 m).    Weight as of this encounter: 112 lb 12.8 oz (51.2 kg).         1. Major depressive disorder, recurrent episode, moderate (H)  Referring for counseling as she has issues with co workers and could benefit from stress management techniques.  Encouraged regular exercise.  She declines SSRI management at this time, advised her to call if her symptoms worsen and not to rely on internet sources alone.  Checking TSH and GLU today.  Return to clinic 2 months, sooner if concerns.  - MENTAL HEALTH REFERRAL  - TSH with free T4 reflex  - Glucose    2. Migraine without aura and without status migrainosus, not intractable  MAP reviewed.  Patient to keep a headache log and return to clinic 2 months/sooner if concerns for review.  Ok to use Excedrin Migraine for headaches but do not use for more than  3 days in the week to avoid rebound headache. Headache triggers/avoidance discussed in detail.    3. CARDIOVASCULAR SCREENING; LDL GOAL LESS THAN 160  Heart healthy diet encouraged along with regular exercise to keep lipids in good control.  - LDL cholesterol direct    See Patient Instructions    SENIA [facility]    "

## 2017-05-31 NOTE — PATIENT INSTRUCTIONS
Based on your medical history and these are the current health maintenance or preventive care services that you are due for (some may have been done at this visit)  Health Maintenance Due   Topic Date Due     MIGRAINE ACTION PLAN  09/09/1996         At [facility], we strive to deliver an exceptional experience to you, every time we see you.    If you receive a survey in the mail, please send us back your thoughts. We really do value your feedback.    Your care team's suggested websites for health information:  Www.[employer].org : Up to date and easily searchable information on multiple topics.  Www.medlineplus.gov : medication info, interactive tutorials, watch real surgeries online  Www.familydoctor.org : good info from the Academy of Family Physicians  Www.cdc.gov : public health info, travel advisories, epidemics (H1N1)  Www.aap.org : children's health info, normal development, vaccinations  Www.health.[facility].[place].us : [place] dept of health, public health issues in [place], N1N1    How to contact your care team:   Team Alexa/Spirit [phone]         Pharmacy [phone]    Dr. Chahal, Pushpa Parker PA-C, Dr. Mccrary, Anjana CONN CNP, Maryam Allen PA-C, Dr. Sainz, and SENIA Parikh CNP    Team RNs: Orquidea Leavitt      Clinic hours  M-Th 7 am-7 pm   Fri 7 am-5 pm.   Urgent care M-F 11 am-9 pm,   Sat/Sun 9 am-5 pm.  Pharmacy M-Th 8 am-8 pm Fri 8 am-6 pm  Sat/Sun 9 am-5 pm.     All password changes, disabled accounts, or ID changes in [employer]/MyHealth will be done by our Access Services Department.    If you need help with your account or password, call: [phone]. Clinic staff no longer has the ability to change passwords.     Depression: Tips to Help Yourself  As your health care providers help treat your depression, you can also help yourself. Keep in mind that your illness affects you emotionally, physically, mentally, and socially. So full recovery will take time.  Take care of your body and your soul, and be patient with yourself as you get better.    Be with others  Don t isolate yourself--you ll only feel worse. Try to be with other people. And take part in fun activities when you can. Go to a movie, ballgame, [employer] service, or social event. Talk openly with people you can trust. And accept help when it s offered.  Keep your perspective    Depression can cloud your judgment. So wait until you feel better before making major life decisions, such as changing jobs, moving, or getting  or .    This illness is not your fault. Don t blame yourself for your depression.    Recovering from depression is a process. Don t be discouraged if it takes some time to feel better.    Depression saps your energy and concentration. So you won t be able to do all the things you used to do. Set small goals and do what you can.  Take care of your body  People with depression often lose the desire to take care of themselves. That only makes their problems worse. During treatment and afterward, make a point to:    Exercise. It s a great way to take care of your body. And studies have shown that exercise helps fight depression.    Avoid drugs and alcohol. These may ease the pain in the short term. But they ll only make your problems worse in the long run.    Get relief from stress. Ask your healthcare provider for relaxation exercises and techniques to help relieve stress.    Eat right. A balanced and healthy diet helps keep your body healthy.    3088-7831 [employer]. [address] [place] [address], [place] [address]. All rights reserved. This information is not intended as a substitute for professional medical care. Always follow your healthcare professional's instructions.        Counseling for Depression  Counseling, also called talk therapy, has been found to be as effective as medication in treating mild to moderate depression. When done by a trained professional, this  treatment is a powerful way to better understand your thoughts and feelings. Like medications, it may take time before you notice how much counseling is helping.    Kinds of talk therapy  Different counselors use different methods for talk therapy. But all therapy aims to help change how you think about your problem. Therapy for depression is often done one-on-one. But it may also be done in a group setting. You and your healthcare provider can discuss the type of therapy you think would work best for you. You can also discuss who the best person is to provide the therapy.  How therapy helps  Talking about your problems can help them seem less overwhelming. It can help work through problems you have with your life and your relationships. It can also help you understand how depression is clouding your thinking, not letting you see the world the way it really is. Therapy can give you:    Insight about your emotions.    New tools for dealing with your problems.    Emotional support for making progress.  Getting better takes time  Talk therapy will help you feel better. But change doesn t happen right away. Depression takes away your energy and motivation. So it can be hard to feel like going to therapy and sticking with it. But therapy has been proven to be very valuable in the treatment of depression. Therapy for depression is often done for a set number of sessions. In other cases, you and your therapist decide together at what point you no longer need therapy.  Additional sources of help  In addition to a professional counselor, it may help to talk to other people in your life. You may find support and insight from:    A close friend or family member.    A , , or rabbi trained in counseling.    A local support group or community group.    A 12-step program (such as Alcoholics Anonymous) for dealing with problems that can contribute to depression, such as alcohol or drug addiction.    2528-7234 The  [employer]. [address] [place] [address], [place] [address]. All rights reserved. This information is not intended as a substitute for professional medical care. Always follow your healthcare professional's instructions.

## 2017-05-31 NOTE — LETTER
[facility]       [address]  [place] [address]      June 1, 2017      Kevin Sebastien  [address]  [facility] [address]              Hi Kevin,    Your labs were normal for you.  Feel free to contact me with any questions or concerns.  Thank you for allowing me to participate in your care.    Pati Aguila APRN, CNP/ag  MRN: [id number]    Results for orders placed or performed in visit on 05/31/17   TSH with free T4 reflex   Result Value Ref Range    TSH 3.72 0.40 - 4.00 mU/L   Glucose   Result Value Ref Range    Glucose 89 70 - 99 mg/dL   LDL cholesterol direct   Result Value Ref Range    LDL Cholesterol Direct 100 (H) <100 mg/dL

## 2017-06-01 ASSESSMENT — PATIENT HEALTH QUESTIONNAIRE - PHQ9: SUM OF ALL RESPONSES TO PHQ QUESTIONS 1-9: 9

## 2017-06-01 ASSESSMENT — ANXIETY QUESTIONNAIRES: GAD7 TOTAL SCORE: 6

## 2017-06-19 ENCOUNTER — OFFICE VISIT (OUTPATIENT)
Dept: PSYCHOLOGY | Facility: CLINIC | Age: 37
End: 2017-06-19
Attending: NURSE PRACTITIONER
Payer: COMMERCIAL

## 2017-06-19 DIAGNOSIS — F33.1 MAJOR DEPRESSIVE DISORDER, RECURRENT EPISODE, MODERATE (H): Primary | ICD-10-CM

## 2017-06-19 PROCEDURE — 90834 PSYTX W PT 45 MINUTES: CPT | Performed by: MARRIAGE & FAMILY THERAPIST

## 2017-06-19 NOTE — Clinical Note
I met with Kevin for therapy intake.  Diagnostic Assessment not completed as she didn't bring completed intake packet to session.  Kevin reported being unsure whether she wants to engage in therapy at present, and will call in to schedule follow-up session if she decides to continue.  Plan (if she follows up) will be to address relationship issues and depression.  Thank you for the referral!
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
## 2017-06-19 NOTE — MR AVS SNAPSHOT
"                  MRN:[id number]                      After Visit Summary   2017    Kevin Crowe    MRN: [id number]           Visit Information        Provider Department      2017 3:00 PM Terry Ojeda, [facility]      MyChart Information     [employer] lets you send messages to your doctor, view your test results, renew your prescriptions, schedule appointments and more. To sign up, go to [email or web address] . Click on \"Log in\" on the left side of the screen, which will take you to the Welcome page. Then click on \"Sign up Now\" on the right side of the page.     You will be asked to enter the access code listed below, as well as some personal information. Please follow the directions to create your username and password.     Your access code is: [email or web address]  Expires: 2017 10:31 AM     Your access code will  in 90 days. If you need help or a new code, please call your [facility] clinic or [phone].        Care EveryWhere ID     This is your Care EveryWhere ID. This could be used by other organizations to access your [facility] medical records  [id number]        "

## 2017-10-25 ENCOUNTER — OFFICE VISIT (OUTPATIENT)
Dept: FAMILY MEDICINE | Facility: CLINIC | Age: 37
End: 2017-10-25
Payer: COMMERCIAL

## 2017-10-25 VITALS
DIASTOLIC BLOOD PRESSURE: 44 MMHG | OXYGEN SATURATION: 98 % | WEIGHT: 112.6 LBS | TEMPERATURE: 98.1 F | BODY MASS INDEX: 22.55 KG/M2 | SYSTOLIC BLOOD PRESSURE: 90 MMHG | HEART RATE: 55 BPM

## 2017-10-25 DIAGNOSIS — K52.9 GASTROENTERITIS: Primary | ICD-10-CM

## 2017-10-25 PROCEDURE — 99213 OFFICE O/P EST LOW 20 MIN: CPT | Performed by: PHYSICIAN ASSISTANT

## 2017-10-25 RX ORDER — ONDANSETRON 4 MG/1
4 TABLET, FILM COATED ORAL EVERY 8 HOURS PRN
Qty: 20 TABLET | Refills: 0 | Status: SHIPPED | OUTPATIENT
Start: 2017-10-25 | End: 2019-06-18

## 2017-10-25 NOTE — PATIENT INSTRUCTIONS
"   * FOOD POISONING or VIRAL GASTROENTERITIS (6yr-Adult)  FOOD POISONING may occur from 6 to 24 hours after eating food that has spoiled and lasts up to1-2 days. VIRAL GASTRO-ENTERITIS is commonly known as the \"stomach flu\" and may last 2-7 days. Symptoms of both illnesses may include vomiting, diarrhea, fever, abdominal cramping. Antibiotics are not effective, but simple home treatment will be helpful.  HOME CARE:    If symptoms are severe, rest at home for the next 24 hours.    Avoid tobacco and alcohol. These may worsen your symptoms.    If medicines for diarrhea (low dose of Immodium: one tablet a day for an adult) or vomiting were prescribed, take only as directed.   During the first 12 to 24 hours follow the diet below:    DRINKS: Sport drinks like Gatorade, soft drinks without caffeine; ginger ale, mineral water (plain or flavored), decaffeinated tea and coffee.    SOUPS: Clear broth, consommé and bouillon    DESSERTS: Plain gelatin (Jell-O), popsicles and fruit juice bars.  During the next 24 hours you may add the following to the above:    Hot cereal, plain toast, bread, rolls, crackers    Plain noodles, rice, mashed potatoes, chicken noodle or rice soup    Unsweetened canned fruit (avoid pineapple), bananas    Limit fat intake to less than 15 grams per day by avoiding margarine, butter, oils, mayonnaise, sauces, gravies, fried foods, peanut butter, meat, poultry and fish.    Limit fiber; avoid raw or cooked vegetables, fresh fruits (except bananas) and bran cereals.    Limit caffeine and chocolate. No spices or seasonings except salt.  Slowly go back to a normal diet as you feel better and your symptoms lessen.  FOLLOW UP with your doctor as advised if you are not better in 2 days. If a stool (diarrhea) sample was taken, you may call in 2 days (or as directed) for the results.  GET PROMPT MEDICAL ATTENTION if any of the following occur:    Increasing abdominal pain or constant pain in one spot    Continued " vomiting (unable to keep liquids down)    Frequent diarrhea (more than 5 times a day)    Blood in vomit or stool (black or red color)    Unable to take in fluids at all    No urine output for 12 hours or extreme thirst    Weakness, dizziness, fainting    Drowsiness, confusion, stiff neck or seizure    Fever over 101.0  F (38.3  C) for more than 3 days    New rash    6328-2961 [employer]. [address] [place] [address], [place] [id number]. All rights reserved. This information is not intended as a substitute for professional medical care. Always follow your healthcare professional's instructions.  This information has been modified by your health care provider with permission from the publisher.

## 2017-10-25 NOTE — MR AVS SNAPSHOT
"              After Visit Summary   10/25/2017    Kevin Crowe    MRN: [id number]           Patient Information     Date Of Birth          1980        Visit Information        Provider Department      10/25/2017 11:40 AM Jamel Wolfe PA [facility]        Today's Diagnoses     Gastroenteritis    -  1      Care Instructions       * FOOD POISONING or VIRAL GASTROENTERITIS (6yr-Adult)  FOOD POISONING may occur from 6 to 24 hours after eating food that has spoiled and lasts up to1-2 days. VIRAL GASTRO-ENTERITIS is commonly known as the \"stomach flu\" and may last 2-7 days. Symptoms of both illnesses may include vomiting, diarrhea, fever, abdominal cramping. Antibiotics are not effective, but simple home treatment will be helpful.  HOME CARE:    If symptoms are severe, rest at home for the next 24 hours.    Avoid tobacco and alcohol. These may worsen your symptoms.    If medicines for diarrhea (low dose of Immodium: one tablet a day for an adult) or vomiting were prescribed, take only as directed.   During the first 12 to 24 hours follow the diet below:    DRINKS: Sport drinks like Gatorade, soft drinks without caffeine; ginger ale, mineral water (plain or flavored), decaffeinated tea and coffee.    SOUPS: Clear broth, consommé and bouillon    DESSERTS: Plain gelatin (Jell-O), popsicles and fruit juice bars.  During the next 24 hours you may add the following to the above:    Hot cereal, plain toast, bread, rolls, crackers    Plain noodles, rice, mashed potatoes, chicken noodle or rice soup    Unsweetened canned fruit (avoid pineapple), bananas    Limit fat intake to less than 15 grams per day by avoiding margarine, butter, oils, mayonnaise, sauces, gravies, fried foods, peanut butter, meat, poultry and fish.    Limit fiber; avoid raw or cooked vegetables, fresh fruits (except bananas) and bran cereals.    Limit caffeine and chocolate. No spices or seasonings except salt.  Slowly go " back to a normal diet as you feel better and your symptoms lessen.  FOLLOW UP with your doctor as advised if you are not better in 2 days. If a stool (diarrhea) sample was taken, you may call in 2 days (or as directed) for the results.  GET PROMPT MEDICAL ATTENTION if any of the following occur:    Increasing abdominal pain or constant pain in one spot    Continued vomiting (unable to keep liquids down)    Frequent diarrhea (more than 5 times a day)    Blood in vomit or stool (black or red color)    Unable to take in fluids at all    No urine output for 12 hours or extreme thirst    Weakness, dizziness, fainting    Drowsiness, confusion, stiff neck or seizure    Fever over 101.0  F (38.3  C) for more than 3 days    New rash    6766-3641 [employer]. [address] [place] [address]. All rights reserved. This information is not intended as a substitute for professional medical care. Always follow your healthcare professional's instructions.  This information has been modified by your health care provider with permission from the publisher.            Follow-ups after your visit        Follow-up notes from your care team     Return if symptoms worsen or fail to improve.      Who to contact     If you have questions or need follow up information about today's clinic visit or your schedule please contact [facility] directly at [phone].  Normal or non-critical lab and imaging results will be communicated to you by MyChart, letter or phone within 4 business days after the clinic has received the results. If you do not hear from us within 7 days, please contact the clinic through MyChart or phone. If you have a critical or abnormal lab result, we will notify you by phone as soon as possible.  Submit refill requests through [employer] or call your pharmacy and they will forward the refill request to us. Please allow 3 business days for your refill to be completed.           "Additional Information About Your Visit        MyChart Information     [employer] lets you send messages to your doctor, view your test results, renew your prescriptions, schedule appointments and more. To sign up, go to [email or web address] . Click on \"Log in\" on the left side of the screen, which will take you to the Welcome page. Then click on \"Sign up Now\" on the right side of the page.     You will be asked to enter the access code listed below, as well as some personal information. Please follow the directions to create your username and password.     Your access code is: [email or web address]  Expires: 2018 12:20 PM     Your access code will  in 90 days. If you need help or a new code, please call your [facility] clinic or [phone].        Care EveryWhere ID     This is your Care EveryWhere ID. This could be used by other organizations to access your [facility] medical records  [id number]        Your Vitals Were     Pulse Temperature Last Period Pulse Oximetry BMI (Body Mass Index)       55 98.1  F (36.7  C) (Oral) 10/12/2017 (Within Days) 98% 22.55 kg/m2        Blood Pressure from Last 3 Encounters:   10/25/17 90/44   17 90/57   04/10/17 (!) 88/61    Weight from Last 3 Encounters:   10/25/17 112 lb 9.6 oz (51.1 kg)   17 112 lb 12.8 oz (51.2 kg)   04/10/17 110 lb 3.2 oz (50 kg)              Today, you had the following     No orders found for display         Today's Medication Changes          These changes are accurate as of: 10/25/17 12:20 PM.  If you have any questions, ask your nurse or doctor.               Start taking these medicines.        Dose/Directions    ondansetron 4 MG tablet   Commonly known as:  ZOFRAN   Used for:  Gastroenteritis   Started by:  Jamel Wolfe PA        Dose:  4 mg   Take 1 tablet (4 mg) by mouth every 8 hours as needed for nausea   Quantity:  20 tablet   Refills:  0            Where to get your medicines      These medications were sent to [id number] - [place], [place] - [address] [facility]  [address] [facility] [address]     Phone:  [phone]     ondansetron 4 MG tablet                Primary Care Provider Office Phone # Fax #    SENIA [facility] [phone]       [facility] [address]  [facility] [address]        Equal Access to Services     USHA BRITO : Hadii aad ku hadasho Soomaali, waaxda luqadaha, qaybta kaalmada adeegyada, waxay idiin hayaan adeeg kharash la'aan . So [facility] [place] [facility] [phone].    ATENCIÓN: Si habla español, tiene a miller disposición servicios gratuitos de asistencia lingüística. [facility] [phone].    We comply with applicable federal civil rights laws and [place] laws. We do not discriminate on the basis of race, color, national origin, age, disability, sex, sexual orientation, or gender identity.            Thank you!     Thank you for choosing [facility]  for your care. Our goal is always to provide you with excellent care. Hearing back from our patients is one way we can continue to improve our services. Please take a few minutes to complete the written survey that you may receive in the mail after your visit with us. Thank you!             Your Updated Medication List - Protect others around you: Learn how to safely use, store and throw away your medicines at www.disposemymeds.org.          This list is accurate as of: 10/25/17 12:20 PM.  Always use your most recent med list.                   Brand Name Dispense Instructions for use Diagnosis    ondansetron 4 MG tablet    ZOFRAN    20 tablet    Take 1 tablet (4 mg) by mouth every 8 hours as needed for nausea    Gastroenteritis       polyethylene glycol powder    MIRALAX    510 g    Take 17 g (1 capful) by mouth daily    Constipation, unspecified constipation type

## 2017-10-25 NOTE — NURSING NOTE
"Chief Complaint   Patient presents with     Nausea     Vomiting     Fever       Initial BP 90/44 (BP Location: Left arm, Patient Position: Chair, Cuff Size: Adult Small)  Pulse 55  Temp 98.1  F (36.7  C) (Oral)  Wt 112 lb 9.6 oz (51.1 kg)  LMP 10/12/2017 (Within Days)  SpO2 98%  BMI 22.55 kg/m2 Estimated body mass index is 22.55 kg/(m^2) as calculated from the following:    Height as of 5/31/17: 4' 11.25\" (1.505 m).    Weight as of this encounter: 112 lb 9.6 oz (51.1 kg).  Medication Reconciliation: complete   Kenzie Rodriguez CMA      "

## 2017-10-25 NOTE — LETTER
[address] [address]  Phone: [phone]    October 25, 2017        Kevin Crowe  [address] [facility] [address]          To whom it may concern:    RE: Kevin Crowe    Patient was seen and treated today at our clinic.  Patient excused from work from 10/19/17 until 10/31/17 .    Patient may return to work without restrictions 10/31/17.           Please contact me for questions or concerns.      Sincerely,        BORA Diaz

## 2017-10-25 NOTE — PROGRESS NOTES
SUBJECTIVE:   Kevin Crowe is a 37 year old female who presents to clinic today for the following health issues:  ABDOMINAL PAIN     Onset: 10/17/17    Description:   Character: painful  Location: epigastric  Radiation: None    Intensity: moderate    Progression of Symptoms:  intermittent    Accompanying Signs & Symptoms:  Fever/Chills?: chills, no fever  Gas/Bloating: no   Blurred vision: Yes  Headache: Yes  Nausea: YES  Vomitting: YES  Boy Aches: Yes  Diarrhea?: YES- and throws up after  Constipation:no   Dysuria or Hematuria: no    History:   Trauma: no   Previous similar pain: YES   Previous tests done: x-ray and CT    Precipitating factors:   Does the pain change with:     Food: YES- had coffee and felt sicker, when ate fruits and started feeling worse    BM: no     Urination: no     Therapies Tried and outcome: none    LMP:  10/12/17  Family members were sick before with similar symptoms        Patient has been feeling symptoms for 6 days. Two children and  have all had similar symptoms over the past couple weeks.  On Sunday, she visited an Uncle who was treated for E. Coli last week, but this was after the onset of her sxs.         Allergies   Allergen Reactions     Pcn [Penicillin G Ammonium]      Rash         Past Medical History:   Diagnosis Date     Headache(784.0)     Off and on for 6 years     Menstrual migraine 4/28/2014     Migraine without aura 4/28/2014         Current Outpatient Prescriptions on File Prior to Visit:  polyethylene glycol (MIRALAX) powder Take 17 g (1 capful) by mouth daily (Patient not taking: Reported on 10/25/2017)     Current Facility-Administered Medications on File Prior to Visit:  sodium chloride (PF) 0.9% PF flush 10 mL       Social History   Substance Use Topics     Smoking status: Never Smoker     Smokeless tobacco: Never Used     Alcohol use No       ROS:  General: negative for fever  Resp: negative for chest pain.   CV: negative for chest pain  GI: as above  :  negative for dysuria  Neurologic:negative for Headache    OBJECTIVE:  BP 90/44 (BP Location: Left arm, Patient Position: Chair, Cuff Size: Adult Small)  Pulse 55  Temp 98.1  F (36.7  C) (Oral)  Wt 112 lb 9.6 oz (51.1 kg)  LMP 10/12/2017 (Within Days)  SpO2 98%  BMI 22.55 kg/m2   General:   awake, alert, and cooperative.  NAD.   Head: Normocephalic, atraumatic.  Eyes: Conjunctiva clear, non icteric.   Heart: Regular rate and rhythm. No murmur.  Lungs: Chest is clear; no wheezes or rales.  ABD: soft, no tenderness to palpation , no rigidity, guarding or rebound , bowel sounds intact  Neuro: Alert and oriented - normal speech.     ASSESSMENT:well appearing    ICD-10-CM    1. Gastroenteritis K52.9 ondansetron (ZOFRAN) 4 MG tablet           PLAN:   Advised about symptoms which might herald more serious problems.

## 2019-06-18 ENCOUNTER — OFFICE VISIT (OUTPATIENT)
Dept: FAMILY MEDICINE | Facility: CLINIC | Age: 39
End: 2019-06-18
Payer: COMMERCIAL

## 2019-06-18 VITALS
SYSTOLIC BLOOD PRESSURE: 92 MMHG | DIASTOLIC BLOOD PRESSURE: 60 MMHG | BODY MASS INDEX: 22.98 KG/M2 | HEIGHT: 59 IN | TEMPERATURE: 98.6 F | OXYGEN SATURATION: 99 % | HEART RATE: 53 BPM | RESPIRATION RATE: 16 BRPM | WEIGHT: 114 LBS

## 2019-06-18 DIAGNOSIS — R55 VASOVAGAL NEAR-SYNCOPE: ICD-10-CM

## 2019-06-18 DIAGNOSIS — R82.90 NONSPECIFIC FINDING ON EXAMINATION OF URINE: ICD-10-CM

## 2019-06-18 DIAGNOSIS — R39.9 UTI SYMPTOMS: Primary | ICD-10-CM

## 2019-06-18 LAB
ALBUMIN UR-MCNC: NEGATIVE MG/DL
ANION GAP SERPL CALCULATED.3IONS-SCNC: 11 MMOL/L (ref 3–14)
APPEARANCE UR: ABNORMAL
BACTERIA #/AREA URNS HPF: ABNORMAL /HPF
BILIRUB UR QL STRIP: NEGATIVE
BUN SERPL-MCNC: 8 MG/DL (ref 7–30)
CALCIUM SERPL-MCNC: 9 MG/DL (ref 8.5–10.1)
CHLORIDE SERPL-SCNC: 106 MMOL/L (ref 94–109)
CO2 SERPL-SCNC: 23 MMOL/L (ref 20–32)
COLOR UR AUTO: YELLOW
CREAT SERPL-MCNC: 0.64 MG/DL (ref 0.52–1.04)
ERYTHROCYTE [DISTWIDTH] IN BLOOD BY AUTOMATED COUNT: 14.5 % (ref 10–15)
GFR SERPL CREATININE-BSD FRML MDRD: >90 ML/MIN/{1.73_M2}
GLUCOSE SERPL-MCNC: 83 MG/DL (ref 70–99)
GLUCOSE UR STRIP-MCNC: NEGATIVE MG/DL
HCT VFR BLD AUTO: 36.5 % (ref 35–47)
HGB BLD-MCNC: 12.1 G/DL (ref 11.7–15.7)
HGB UR QL STRIP: ABNORMAL
KETONES UR STRIP-MCNC: NEGATIVE MG/DL
LEUKOCYTE ESTERASE UR QL STRIP: ABNORMAL
MCH RBC QN AUTO: 24.3 PG (ref 26.5–33)
MCHC RBC AUTO-ENTMCNC: 33.2 G/DL (ref 31.5–36.5)
MCV RBC AUTO: 73 FL (ref 78–100)
MUCOUS THREADS #/AREA URNS LPF: PRESENT /LPF
NITRATE UR QL: NEGATIVE
NON-SQ EPI CELLS #/AREA URNS LPF: ABNORMAL /LPF
PH UR STRIP: 5 PH (ref 5–7)
PLATELET # BLD AUTO: 281 10E9/L (ref 150–450)
POTASSIUM SERPL-SCNC: 4.1 MMOL/L (ref 3.4–5.3)
RBC # BLD AUTO: 4.98 10E12/L (ref 3.8–5.2)
RBC #/AREA URNS AUTO: ABNORMAL /HPF
SODIUM SERPL-SCNC: 140 MMOL/L (ref 133–144)
SOURCE: ABNORMAL
SP GR UR STRIP: 1.02 (ref 1–1.03)
TSH SERPL DL<=0.005 MIU/L-ACNC: 2.07 MU/L (ref 0.4–4)
UROBILINOGEN UR STRIP-ACNC: 0.2 EU/DL (ref 0.2–1)
WBC # BLD AUTO: 9.5 10E9/L (ref 4–11)
WBC #/AREA URNS AUTO: ABNORMAL /HPF

## 2019-06-18 PROCEDURE — 99214 OFFICE O/P EST MOD 30 MIN: CPT | Performed by: PHYSICIAN ASSISTANT

## 2019-06-18 PROCEDURE — 36415 COLL VENOUS BLD VENIPUNCTURE: CPT | Performed by: PHYSICIAN ASSISTANT

## 2019-06-18 PROCEDURE — 81001 URINALYSIS AUTO W/SCOPE: CPT | Performed by: PHYSICIAN ASSISTANT

## 2019-06-18 PROCEDURE — 85027 COMPLETE CBC AUTOMATED: CPT | Performed by: PHYSICIAN ASSISTANT

## 2019-06-18 PROCEDURE — 84443 ASSAY THYROID STIM HORMONE: CPT | Performed by: PHYSICIAN ASSISTANT

## 2019-06-18 PROCEDURE — 87088 URINE BACTERIA CULTURE: CPT | Performed by: PHYSICIAN ASSISTANT

## 2019-06-18 PROCEDURE — 87186 SC STD MICRODIL/AGAR DIL: CPT | Performed by: PHYSICIAN ASSISTANT

## 2019-06-18 PROCEDURE — 87086 URINE CULTURE/COLONY COUNT: CPT | Performed by: PHYSICIAN ASSISTANT

## 2019-06-18 PROCEDURE — 80048 BASIC METABOLIC PNL TOTAL CA: CPT | Performed by: PHYSICIAN ASSISTANT

## 2019-06-18 RX ORDER — NITROFURANTOIN 25; 75 MG/1; MG/1
100 CAPSULE ORAL 2 TIMES DAILY
Qty: 14 CAPSULE | Refills: 0 | Status: SHIPPED | OUTPATIENT
Start: 2019-06-18 | End: 2019-06-25

## 2019-06-18 ASSESSMENT — PAIN SCALES - GENERAL: PAINLEVEL: MODERATE PAIN (5)

## 2019-06-18 ASSESSMENT — MIFFLIN-ST. JEOR: SCORE: 1096.69

## 2019-06-18 NOTE — PROGRESS NOTES
Subjective     Kevin Crowe is a 38 year old female who presents to clinic today for the following health issues:    HPI   URINARY TRACT SYMPTOMS  Onset: for 3 days    Description:   Painful urination (Dysuria): YES  Blood in urine (Hematuria): no   Delay in urine (Hesitency): YES    Intensity: severe    Progression of Symptoms:  same    Accompanying Signs & Symptoms:  Fever/chills: No but body ache  Flank pain no but this past weekend left hip to back pain  Nausea and vomiting: no, but felt lightheaded  Any vaginal symptoms: stinging last Friday, odor  Abdominal/Pelvic Pain: YES- pelvic    History:   History of frequent UTI's: no   History of kidney stones: no   Sexually Active: no   Possibility of pregnancy: Don't Know    Precipitating factors:   None    Therapies Tried and outcome: OTC UTI medication did not help    Patient gets lightheaded if she squats then get up or gets up fast from bed       Duration: lifetime    Description (location/character/radiation): gets lighheaded    Intensity:  mild    Accompanying signs and symptoms: none, no exercise induced chest pain, shortness of breath, lighheadness    History (similar episodes/previous evaluation): has had it all her life    Precipitating or alleviating factors: change in posture    Therapies tried and outcome: None       Patient Active Problem List   Diagnosis     Migraine without aura     Menstrual migraine     Major depressive disorder, recurrent episode, moderate (H)     Anxiety     Constipation, unspecified constipation type     CARDIOVASCULAR SCREENING; LDL GOAL LESS THAN 160     Past Surgical History:   Procedure Laterality Date     GYN SURGERY  2008, 2002    C-sections       Social History     Tobacco Use     Smoking status: Never Smoker     Smokeless tobacco: Never Used   Substance Use Topics     Alcohol use: No     Family History   Problem Relation Age of Onset     Alzheimer Disease Maternal Grandmother 50     Neurologic Disorder Paternal Grandmother   "       migraines/unknown age     Cancer - colorectal No family hx of          No current outpatient medications on file.     Allergies   Allergen Reactions     Penicillins      Rash         Reviewed and updated as needed this visit by Provider  Tobacco  Allergies  Meds  Problems  Med Hx  Surg Hx  Fam Hx         Review of Systems   ROS COMP: Constitutional, HEENT, cardiovascular, pulmonary, gi and gu systems are negative, except as otherwise noted.      Objective    BP 92/60 (BP Location: Right arm, Patient Position: Chair, Cuff Size: Adult Regular)   Pulse 53   Temp 98.6  F (37  C) (Oral)   Resp 16   Ht 1.505 m (4' 11.25\")   Wt 51.7 kg (114 lb)   LMP 05/18/2019 (Approximate)   SpO2 99%   BMI 22.83 kg/m    Body mass index is 22.83 kg/m .  Physical Exam   GENERAL: healthy, alert and no distress  EYES: Eyes grossly normal to inspection, PERRL and conjunctivae and sclerae normal  HENT: ear canals and TM's normal, nose and mouth without ulcers or lesions  NECK: no adenopathy, no asymmetry, masses, or scars and thyroid normal to palpation  RESP: lungs clear to auscultation - no rales, rhonchi or wheezes  CV: regular rate and rhythm, normal S1 S2, no S3 or S4, no murmur, click or rub, no peripheral edema and peripheral pulses strong  ABDOMEN: soft, nontender, no hepatosplenomegaly, no masses and bowel sounds normal  MS: no gross musculoskeletal defects noted, no edema  NEURO: Normal strength and tone, mentation intact and speech normal  BACK: no CVA tenderness, no paralumbar tenderness    Diagnostic Test Results:  Labs reviewed in Epic  Results for orders placed or performed in visit on 06/18/19   UA reflex to Microscopic and Culture   Result Value Ref Range    Color Urine Yellow     Appearance Urine Cloudy     Glucose Urine Negative NEG^Negative mg/dL    Bilirubin Urine Negative NEG^Negative    Ketones Urine Negative NEG^Negative mg/dL    Specific Gravity Urine 1.020 1.003 - 1.035    Blood Urine Moderate " (A) NEG^Negative    pH Urine 5.0 5.0 - 7.0 pH    Protein Albumin Urine Negative NEG^Negative mg/dL    Urobilinogen Urine 0.2 0.2 - 1.0 EU/dL    Nitrite Urine Negative NEG^Negative    Leukocyte Esterase Urine Large (A) NEG^Negative    Source Midstream Urine    Urine Microscopic   Result Value Ref Range    WBC Urine  (A) OTO5^0 - 5 /HPF    RBC Urine 25-50 (A) OTO2^O - 2 /HPF    Squamous Epithelial /LPF Urine Few FEW^Few /LPF    Bacteria Urine Moderate (A) NEG^Negative /HPF    Mucous Urine Present (A) NEG^Negative /LPF   CBC with platelets   Result Value Ref Range    WBC 9.5 4.0 - 11.0 10e9/L    RBC Count 4.98 3.8 - 5.2 10e12/L    Hemoglobin 12.1 11.7 - 15.7 g/dL    Hematocrit 36.5 35.0 - 47.0 %    MCV 73 (L) 78 - 100 fl    MCH 24.3 (L) 26.5 - 33.0 pg    MCHC 33.2 31.5 - 36.5 g/dL    RDW 14.5 10.0 - 15.0 %    Platelet Count 281 150 - 450 10e9/L   TSH with free T4 reflex   Result Value Ref Range    TSH 2.07 0.40 - 4.00 mU/L   Basic metabolic panel  (Ca, Cl, CO2, Creat, Gluc, K, Na, BUN)   Result Value Ref Range    Sodium 140 133 - 144 mmol/L    Potassium 4.1 3.4 - 5.3 mmol/L    Chloride 106 94 - 109 mmol/L    Carbon Dioxide 23 20 - 32 mmol/L    Anion Gap 11 3 - 14 mmol/L    Glucose 83 70 - 99 mg/dL    Urea Nitrogen 8 7 - 30 mg/dL    Creatinine 0.64 0.52 - 1.04 mg/dL    GFR Estimate >90 >60 mL/min/[1.73_m2]    GFR Estimate If Black >90 >60 mL/min/[1.73_m2]    Calcium 9.0 8.5 - 10.1 mg/dL   Urine Culture Aerobic Bacterial   Result Value Ref Range    Specimen Description Midstream Urine     Culture Micro 50,000 to 100,000 colonies/mL  Escherichia coli   (A)        Susceptibility    Escherichia coli - ROLAN     AMPICILLIN 4 Sensitive ug/mL     CEFAZOLIN* <=4 Sensitive ug/mL      * Cefazolin ROLAN breakpoints are for the treatment of uncomplicated urinary tract infections.  For the treatment of systemic infections, please contact the laboratory for additional testing.     CEFOXITIN <=4 Sensitive ug/mL     CEFTAZIDIME <=1  Sensitive ug/mL     CEFTRIAXONE <=1 Sensitive ug/mL     CIPROFLOXACIN <=0.25 Sensitive ug/mL     GENTAMICIN <=1 Sensitive ug/mL     LEVOFLOXACIN <=0.12 Sensitive ug/mL     NITROFURANTOIN <=16 Sensitive ug/mL     TOBRAMYCIN <=1 Sensitive ug/mL     Trimethoprim/Sulfa <=1/19 Sensitive ug/mL     AMPICILLIN/SULBACTAM <=2 Sensitive ug/mL     Piperacillin/Tazo <=4 Sensitive ug/mL     CEFEPIME <=1 Sensitive ug/mL           Assessment & Plan       ICD-10-CM    1. UTI symptoms R39.9 UA reflex to Microscopic and Culture     Urine Microscopic     nitroFURantoin macrocrystal-monohydrate (MACROBID) 100 MG capsule   2. Vasovagal near-syncope R55 CBC with platelets     TSH with free T4 reflex     Basic metabolic panel  (Ca, Cl, CO2, Creat, Gluc, K, Na, BUN)     CANCELED: Glucose   3. Nonspecific finding on examination of urine R82.90 Urine Culture Aerobic Bacterial        2. Please take Nitrofurantoin twice a day with large amount of water for 7 days   Increase fluids  Follow up or call in 3 days if not better  Urine culture is pending  For prevention wipe front to back, urinate and wash after sex.       1. Push water , drink six 8 oz glasses of water daily to stay hydrated and   Eat regularly  prevent vasovagal episodes       Return in about 3 days (around 6/21/2019), or if symptoms worsen or fail to improve.    Vandana Parker PA-C  [facility]

## 2019-06-18 NOTE — PATIENT INSTRUCTIONS
"Please take Nitrofurantoin twice a day with large amount of water for 7 days   Increase fluids  Follow up or call in 3 days if not better  Urine culture is pending  For prevention wipe front to back, urinate and wash after sex.       Push water , drink six 8 oz glasses of water daily to stay hydrate and prevent vasovagal episodes         Patient Education     Near-Fainting: Vagal Reaction  Fainting (syncope) is a temporary loss of consciousness (passing out). It is associated with a loss of postural tone. Postural tone is the constant contraction of the muscles in your body to help keep your body upright. It also helps blood return towards the heart and brain. Syncope occurs when there is reduced blood flow to the brain due to this common vagal reaction. A vagal reaction is a reflex response that causes a sudden drop in your blood pressure, and your pulse to slow down. If the pulse is low enough, the blood pressure falls and causes fainting or near-fainting. Lying down usually stops the reaction very quickly.  These are symptoms of near-fainting:    Feeling lightheaded or like you are going to faint    Weak pulse    Nausea    Sweating    Blurred vision or feeling like your vision is \"blacking out\"    Palpitations    Chest pain    Trouble breathing    Cool and clammy skin  Causes for near-fainting include:    Sudden emotional stress like fear, pain, panic, sight of blood    Straining or overexertion, straining while using the toilet, coughing, sneezing    Standing up too quickly, or standing up for too long a time    Pregnancy  Home care  The following will help you care for yourself at home:    Rest today and go back to your normal activities as soon as you are feeling back to normal.    If you become light-headed or dizzy, lie down right away or sit with your head lowered between your knees.    Stay hydrated and do not skip meals.    Don't stand for long periods or stay in hot places    Do what you can to prevent " constipation. If you bear down excessively when trying to have a bowel movement, this can trigger a vagal response  There may be other causes for a vagal response and near-syncope. For example, this can happen after open-heart surgery when the heart muscle is inflamed and irritated.  Check with your doctor to see if there is testing you need such as a tilt-table test, heart rhythm monitoring, or blood tests. Review the medicines you take with your healthcare provider and pharmacist to be sure the symptoms you have are not a side effect of a medicine.  Follow-up care  Follow up with your healthcare provider, or as advised.   If you are having frequent episodes of near-syncope or vagal reactions, be cautious about activities such as driving that could harm yourself or others if you were to faint. Do not drive or operate heavy machinery if you are feeling like you may faint.  Call 911  Call 911 if any of these occur:    Another fainting spell occurs, and it is not explained by the common causes listed above    Fainting or loss of consciousness    Chest, arm, neck, jaw, back, or abdominal pain    Shortness of breath    Weakness, tingling, or numbness in one side of the face, one arm or leg    Slurred speech, confusion, trouble walking or seeing    Seizure    Blood in vomit or stools (black or red color)  When to seek medical advice  Call your healthcare provider right away if you have occasional mild lightheadedness, especially when standing up.  Date Last Reviewed: 6/1/2016 2000-2018 [employer]. [address] [place] [address], [place] [address]. All rights reserved. This information is not intended as a substitute for professional medical care. Always follow your healthcare professional's instructions.

## 2019-06-18 NOTE — LETTER
June 25, 2019      Kevin Crowe  [address]  [place] [address]        Dear ,    We are writing to inform you of your test results.    Blood work is within normal limits. Urine culture is positive for infection, please finish antibiotic completely.    Resulted Orders   UA reflex to Microscopic and Culture   Result Value Ref Range    Color Urine Yellow     Appearance Urine Cloudy     Glucose Urine Negative NEG^Negative mg/dL    Bilirubin Urine Negative NEG^Negative    Ketones Urine Negative NEG^Negative mg/dL    Specific Gravity Urine 1.020 1.003 - 1.035    Blood Urine Moderate (A) NEG^Negative    pH Urine 5.0 5.0 - 7.0 pH    Protein Albumin Urine Negative NEG^Negative mg/dL    Urobilinogen Urine 0.2 0.2 - 1.0 EU/dL    Nitrite Urine Negative NEG^Negative    Leukocyte Esterase Urine Large (A) NEG^Negative    Source Midstream Urine    Urine Microscopic   Result Value Ref Range    WBC Urine  (A) OTO5^0 - 5 /HPF    RBC Urine 25-50 (A) OTO2^O - 2 /HPF    Squamous Epithelial /LPF Urine Few FEW^Few /LPF    Bacteria Urine Moderate (A) NEG^Negative /HPF    Mucous Urine Present (A) NEG^Negative /LPF   Urine Culture Aerobic Bacterial   Result Value Ref Range    Specimen Description Midstream Urine     Culture Micro 50,000 to 100,000 colonies/mL  Escherichia coli   (A)    CBC with platelets   Result Value Ref Range    WBC 9.5 4.0 - 11.0 10e9/L    RBC Count 4.98 3.8 - 5.2 10e12/L    Hemoglobin 12.1 11.7 - 15.7 g/dL    Hematocrit 36.5 35.0 - 47.0 %    MCV 73 (L) 78 - 100 fl    MCH 24.3 (L) 26.5 - 33.0 pg    MCHC 33.2 31.5 - 36.5 g/dL    RDW 14.5 10.0 - 15.0 %    Platelet Count 281 150 - 450 10e9/L   TSH with free T4 reflex   Result Value Ref Range    TSH 2.07 0.40 - 4.00 mU/L   Basic metabolic panel  (Ca, Cl, CO2, Creat, Gluc, K, Na, BUN)   Result Value Ref Range    Sodium 140 133 - 144 mmol/L    Potassium 4.1 3.4 - 5.3 mmol/L    Chloride 106 94 - 109 mmol/L    Carbon Dioxide 23 20 - 32 mmol/L    Anion Gap  11 3 - 14 mmol/L    Glucose 83 70 - 99 mg/dL    Urea Nitrogen 8 7 - 30 mg/dL    Creatinine 0.64 0.52 - 1.04 mg/dL    GFR Estimate >90 >60 mL/min/[1.73_m2]      Comment:      Non  GFR Calc  Starting 12/18/2018, serum creatinine based estimated GFR (eGFR) will be   calculated using the Chronic Kidney Disease Epidemiology Collaboration   (CKD-EPI) equation.      GFR Estimate If Black >90 >60 mL/min/[1.73_m2]      Comment:       GFR Calc  Starting 12/18/2018, serum creatinine based estimated GFR (eGFR) will be   calculated using the Chronic Kidney Disease Epidemiology Collaboration   (CKD-EPI) equation.      Calcium 9.0 8.5 - 10.1 mg/dL       If you have any questions or concerns, please call the clinic at the number listed above.       Sincerely,        Vandana Parker PA-C

## 2019-06-18 NOTE — LETTER
[address] [address]  Phone: [phone]    June 18, 2019        Kevin Crowe  [address] [place] [address]          To whom it may concern:    RE: Kevin Crowe    Patient was seen and treated today at our clinic and missed work 06/18/19 due to a medical condition    Please contact me for questions or concerns.      Sincerely,        Pushpa Parker PAC

## 2019-06-20 LAB
BACTERIA SPEC CULT: ABNORMAL
SPECIMEN SOURCE: ABNORMAL

## 2019-06-25 NOTE — RESULT ENCOUNTER NOTE
Letter sent to patients home address with results.  Guicho Montalvo,  [facility]

## 2020-12-03 ENCOUNTER — ANCILLARY PROCEDURE (OUTPATIENT)
Dept: GENERAL RADIOLOGY | Facility: CLINIC | Age: 40
End: 2020-12-03
Attending: NURSE PRACTITIONER
Payer: COMMERCIAL

## 2020-12-03 ENCOUNTER — OFFICE VISIT (OUTPATIENT)
Dept: FAMILY MEDICINE | Facility: CLINIC | Age: 40
End: 2020-12-03
Payer: COMMERCIAL

## 2020-12-03 VITALS
HEIGHT: 59 IN | HEART RATE: 51 BPM | OXYGEN SATURATION: 99 % | DIASTOLIC BLOOD PRESSURE: 56 MMHG | TEMPERATURE: 96.9 F | SYSTOLIC BLOOD PRESSURE: 104 MMHG | BODY MASS INDEX: 22.01 KG/M2 | WEIGHT: 109.2 LBS

## 2020-12-03 DIAGNOSIS — R73.03 PREDIABETES: ICD-10-CM

## 2020-12-03 DIAGNOSIS — M79.672 LEFT FOOT PAIN: Primary | ICD-10-CM

## 2020-12-03 DIAGNOSIS — F33.1 MAJOR DEPRESSIVE DISORDER, RECURRENT EPISODE, MODERATE (H): ICD-10-CM

## 2020-12-03 DIAGNOSIS — M79.672 LEFT FOOT PAIN: ICD-10-CM

## 2020-12-03 DIAGNOSIS — D50.9 IRON DEFICIENCY ANEMIA, UNSPECIFIED IRON DEFICIENCY ANEMIA TYPE: ICD-10-CM

## 2020-12-03 LAB
ERYTHROCYTE [DISTWIDTH] IN BLOOD BY AUTOMATED COUNT: 14 % (ref 10–15)
HCG UR QL: NEGATIVE
HCT VFR BLD AUTO: 36.4 % (ref 35–47)
HGB BLD-MCNC: 11.8 G/DL (ref 11.7–15.7)
MCH RBC QN AUTO: 24.7 PG (ref 26.5–33)
MCHC RBC AUTO-ENTMCNC: 32.4 G/DL (ref 31.5–36.5)
MCV RBC AUTO: 76 FL (ref 78–100)
PLATELET # BLD AUTO: 317 10E9/L (ref 150–450)
RBC # BLD AUTO: 4.78 10E12/L (ref 3.8–5.2)
WBC # BLD AUTO: 6.3 10E9/L (ref 4–11)

## 2020-12-03 PROCEDURE — 36415 COLL VENOUS BLD VENIPUNCTURE: CPT | Performed by: NURSE PRACTITIONER

## 2020-12-03 PROCEDURE — 81025 URINE PREGNANCY TEST: CPT | Performed by: NURSE PRACTITIONER

## 2020-12-03 PROCEDURE — 99214 OFFICE O/P EST MOD 30 MIN: CPT | Performed by: NURSE PRACTITIONER

## 2020-12-03 PROCEDURE — 73630 X-RAY EXAM OF FOOT: CPT | Mod: LT | Performed by: RADIOLOGY

## 2020-12-03 PROCEDURE — 85027 COMPLETE CBC AUTOMATED: CPT | Performed by: NURSE PRACTITIONER

## 2020-12-03 RX ORDER — IBUPROFEN 400 MG/1
400 TABLET, FILM COATED ORAL EVERY 6 HOURS PRN
Qty: 60 TABLET | Refills: 1 | Status: SHIPPED | OUTPATIENT
Start: 2020-12-03

## 2020-12-03 ASSESSMENT — PAIN SCALES - GENERAL: PAINLEVEL: EXTREME PAIN (8)

## 2020-12-03 ASSESSMENT — MIFFLIN-ST. JEOR: SCORE: 1064.92

## 2020-12-03 NOTE — PATIENT INSTRUCTIONS
At [facility], we strive to deliver an exceptional experience to you, every time we see you. If you receive a survey, please complete it as we do value your feedback.  If you have MyChart, you can expect to receive results automatically within 24 hours of their completion.  Your provider will send a note interpreting your results as well.   If you do not have MyChart, you should receive your results in about a week by mail.    Your care team:                            Family Medicine Internal Medicine   MD Caleb Lopez, MD Quincy Dhillon MD Katya Georgiev PA-C Megan Hill, APRN CNP    Harris Hernandez, MD Pediatrics   Omid Wolfe, PABrayanC  Shauna Benavides, CNP MD Anjana Choi APRN CNP   MD Karla Singh MD Deborah Mielke, MD Gena Aguila, APRN CNP  FIDENCIO Eddy, CNP  MD Jessika Dominique MD Angela Wermerskirchen, MD      Clinic hours: Monday - Thursday 7 am-7 pm; Fridays 7 am-5 pm.   Urgent care: Monday - Friday 11 am-9 pm; Saturday and Sunday 9 am-5 pm.    Clinic: [phone]       [facility]: Monday - Thursday 8 am - 7 pm; Friday 8 am - 6 pm  [facility]: [phone]     Use www.oncare.org for 24/7 diagnosis and treatment of dozens of conditions.    Patient Education     Understanding Bunions    A bunion is a bony bump on the joint at the base of the big toe. A bunion changes the shape of the foot. It can also cause pain and problems using the foot.   The bony bump at the base of the big toe is caused by misalignment of the toe joint. This causes the bones to sit at an angle instead of straight. The big toe often turns in toward the second toe. In time, the big toe may start to overlap the second toe.     What causes bunions?  It is not clear what causes bunions, but many factors are likely involved.  Bunions often run in families. They may be more common in people who have loose joints. Wearing tight shoes may also cause bunions.   Symptoms of bunions  At first, the problem may be painless. As symptoms develop, they may include:    Foot pain or stiffness    Swelling over the joint    Redness and inflammation    Skin irritation or thickened skin over the joint  Treatment for bunions  In most cases, your healthcare provider will try nonsurgical treatments first. Most of these treatments won t cure the bunion, but they can help relieve symptoms and keep the bunion from getting worse. These include:     Wearing low-heeled shoes with a wide toe box. This can help relieve pressure on the bunion and make walking more comfortable.    Using padding or special shoe inserts called orthotics. Inserts can be custom-made for your foot. They help support the foot and may change how the foot is aligned.    Wearing a toe splint at night. This can help hold the toe in a straighter position.    Putting an ice pack on a swollen joint. This can help reduce pain and swelling.  If the bunion causes severe pain or problems using the foot, your provider may recommend surgery. During surgery, excess bone may be removed and the joint is realigned.   When to call your healthcare provider  Call your healthcare provider right away if you have any of these:    Fever of 100.4 F (38 C) or higher, or as directed by your provider    Chills    Symptoms that don t get better, or get worse    New symptoms  Melba last reviewed this educational content on 6/1/2019 2000-2020 [email or web address]. [address] [place] [address], [place] [address]. All rights reserved. This information is not intended as a substitute for professional medical care. Always follow your healthcare professional's instructions.

## 2020-12-03 NOTE — PROGRESS NOTES
Subjective     Kevin Crowe is a 40 year old female who presents to clinic today for the following health issues:    HPI         Musculoskeletal problem/pain  Onset/Duration: 1 year  Description  Location: foot - left  Joint Swelling: YES  Redness: YES, occasionally  Pain: YES  Warmth: YES  Intensity:  severe  Progression of Symptoms:  worsening  Accompanying signs and symptoms:   Fevers: no  Numbness/tingling/weakness: YES, tingling and numbness  History  Trauma to the area: YES- Foot was stepped on while running in the gym   1 year ago- no imaging  done  Recent illness:  no  Previous similar problem: no  Previous evaluation:  no  Precipitating or alleviating factors:  Aggravating factors include: standing, walking, climbing stairs and overuse. Works on concrete as a merchandizer,wears steel toe boots to work.  Therapies tried and outcome: heat and massage, temporary relief only, has not taken anything for pain.    Patient admits to increased work stress, stress from COVID, is not taking anything for depression/anxiety, is not interested in counseling.  P{HQ-9=6, GAGE-7=2 today. No SI/HI.    Anemia- not taking iron supplement, does not eat much meat.  No weakness, dizziness, palpitaitions, but does complain of fatigue.       How many servings of fruits and vegetables do you eat daily?  0-1    On average, how many sweetened beverages do you drink each day (Examples: soda, juice, sweet tea, etc.  Do NOT count diet or artificially sweetened beverages)?   1    How many days per week do you exercise enough to make your heart beat faster? 3 or less    How many minutes a day do you exercise enough to make your heart beat faster? 10 - 19    How many days per week do you miss taking your medication? 0      Review of Systems   Constitutional, HEENT, cardiovascular, pulmonary, gi and gu systems are negative, except as otherwise noted.      Objective    /56 (BP Location: Left arm, Patient Position: Sitting, Cuff Size: Adult  "Regular)   Pulse 51   Temp 96.9  F (36.1  C) (Tympanic)   Ht 1.505 m (4' 11.25\")   Wt 49.5 kg (109 lb 3.2 oz)   LMP 11/26/2020 (Exact Date)   SpO2 99%   BMI 21.87 kg/m    Body mass index is 21.87 kg/m .  Physical Exam   GENERAL: healthy, alert and no distress  EYES: Eyes grossly normal to inspection, PERRL and conjunctivae and sclerae normal  HENT: ear canals and TM's normal, nose and mouth without ulcers or lesions  NECK: no adenopathy, no asymmetry, masses, or scars and thyroid normal to palpation  RESP: lungs clear to auscultation - no rales, rhonchi or wheezes  CV: regular rate and rhythm, normal S1 S2, no S3 or S4, no murmur, click or rub, no peripheral edema and peripheral pulses strong  ABDOMEN: soft, nontender, no hepatosplenomegaly, no masses and bowel sounds normal  MS: left foot- no point tenderness but points occupational therapy midfoot as area that is painful, FAROM, no palpable masses, albino;l DP/PT piulses bilaterally, otherwise, no gross musculoskeletal defects noted, no edema  SKIN: no suspicious lesions or rashes  NEURO: Normal strength and tone, mentation intact and speech normal  BACK: no CVA tenderness, no paralumbar tenderness  PSYCH: mentation appears normal, affect normal/bright  LYMPH: no cervical, supraclavicular, axillary, or inguinal adenopathy    Results for orders placed or performed in visit on 12/03/20   XR Foot Left G/E 3 Views     Status: None    Narrative    XR FOOT LT G/E 3 VW   12/3/2020 2:55 PM     HISTORY:  pain at 1st MPT, plantar aspect after foot was stepped on 1  year ago; Left foot pain      Impression    IMPRESSION: Unremarkable exam.    PENNIE SIMONS MD   Results for orders placed or performed in visit on 12/03/20   HCG qualitative urine     Status: None   Result Value Ref Range    HCG Qual Urine Negative NEG^Negative   CBC with platelets     Status: Abnormal   Result Value Ref Range    WBC 6.3 4.0 - 11.0 10e9/L    RBC Count 4.78 3.8 - 5.2 10e12/L    Hemoglobin " 11.8 11.7 - 15.7 g/dL    Hematocrit 36.4 35.0 - 47.0 %    MCV 76 (L) 78 - 100 fl    MCH 24.7 (L) 26.5 - 33.0 pg    MCHC 32.4 31.5 - 36.5 g/dL    RDW 14.0 10.0 - 15.0 %    Platelet Count 317 150 - 450 10e9/L           Assessment & Plan     Left foot pain  No acute fracture on x ray by my independent read, await formal read by Radiology. OK to take Advil prn pain, RICE, advised her to wear a hard shoe, do not go barefoot, consider Podiatry referral if not improved   - XR Foot Left G/E 3 Views  - HCG qualitative urine  - ibuprofen (ADVIL/MOTRIN) 400 MG tablet  Dispense: 60 tablet; Refill: 1    Iron deficiency anemia, unspecified iron deficiency anemia type  Anemia is resolved, reviewed high iron containing foods   - CBC with platelets  - JUST IN CASE    Major depressive disorder, recurrent episode, moderate (H)  {Patient declines counseling or medication for her depressive symptoms, reviewed self care, indications for return to clinic/UC visit, verbally contracted for safety.    Prediabetes  Checking A1c given history of prediabetes. Low chol diet encouraged as well as regular exercise (walking for now given current foot pain)  - **A1C FUTURE anytime          Regular exercise  See Patient Instructions    Return in about 4 weeks (around 12/31/2020), or if symptoms worsen or fail to improve.    SENIA Miller [facility]

## 2020-12-03 NOTE — LETTER
December 7, 2020      Kevin Crowe  [address]  [place] [address]        Hi Kevin,     Your pregnancy test was negative and your blood count was unremarkable. Feel free to contact me with any questions or concerns.  Thank you for allowing me to participate in your care.         Pati CONN, CNP     Resulted Orders   HCG qualitative urine   Result Value Ref Range    HCG Qual Urine Negative NEG^Negative      Comment:      This test is for screening purposes.  Results should be interpreted along with   the clinical picture.  Confirmation testing is available if warranted by   ordering [id number], HCG Quantitative Pregnancy.     CBC with platelets   Result Value Ref Range    WBC 6.3 4.0 - 11.0 10e9/L    RBC Count 4.78 3.8 - 5.2 10e12/L    Hemoglobin 11.8 11.7 - 15.7 g/dL    Hematocrit 36.4 35.0 - 47.0 %    MCV 76 (L) 78 - 100 fl    MCH 24.7 (L) 26.5 - 33.0 pg    MCHC 32.4 31.5 - 36.5 g/dL      Comment:      Results confirmed by repeat test    RDW 14.0 10.0 - 15.0 %    Platelet Count 317 150 - 450 10e9/L

## 2020-12-07 ASSESSMENT — ANXIETY QUESTIONNAIRES
7. FEELING AFRAID AS IF SOMETHING AWFUL MIGHT HAPPEN: NOT AT ALL
5. BEING SO RESTLESS THAT IT IS HARD TO SIT STILL: NOT AT ALL
2. NOT BEING ABLE TO STOP OR CONTROL WORRYING: NOT AT ALL
3. WORRYING TOO MUCH ABOUT DIFFERENT THINGS: SEVERAL DAYS
IF YOU CHECKED OFF ANY PROBLEMS ON THIS QUESTIONNAIRE, HOW DIFFICULT HAVE THESE PROBLEMS MADE IT FOR YOU TO DO YOUR WORK, TAKE CARE OF THINGS AT HOME, OR GET ALONG WITH OTHER PEOPLE: NOT DIFFICULT AT ALL
GAD7 TOTAL SCORE: 2
1. FEELING NERVOUS, ANXIOUS, OR ON EDGE: NOT AT ALL
6. BECOMING EASILY ANNOYED OR IRRITABLE: NOT AT ALL

## 2020-12-07 ASSESSMENT — PATIENT HEALTH QUESTIONNAIRE - PHQ9
5. POOR APPETITE OR OVEREATING: SEVERAL DAYS
SUM OF ALL RESPONSES TO PHQ QUESTIONS 1-9: 6

## 2020-12-08 ASSESSMENT — ANXIETY QUESTIONNAIRES: GAD7 TOTAL SCORE: 2

## 2022-01-10 ENCOUNTER — NURSE TRIAGE (OUTPATIENT)
Dept: NURSING | Facility: CLINIC | Age: 42
End: 2022-01-10
Payer: COMMERCIAL

## 2022-01-10 NOTE — TELEPHONE ENCOUNTER
FNA calling to see the status of this second level triage. Let FNA know that she should route it to the [facility] RN Nurse Triage pool and not to the TC's.     Rosenda King RN BSN

## 2022-01-10 NOTE — TELEPHONE ENCOUNTER
Called patient back & read provider message as written below.    Patient states she does not have a pulse oximeter at her house but will get one and monitor oxygen levels. Advised that if below 90% she needs to be evaluated in the ED. Patient verbalized understanding. Patient states she is not feeling SOB now.     Patient states she was given Albuterol prescription today by the provider she met with - States she has used this twice today. Pt reports she got little to no relief from this inhaler. Since albuterol is not helping, advised Pt that she should be seen with provider in office or virtually - Scheduled appointment tomorrow morning with provider at [facility] for evaluation and possible chest xray.     Advised patient that any shortness of breath, chest pain, new or worsening symptoms she should be seen in the ED. Patient verbalized an understanding of this and agreed to this plan.     Routing to provider as MISSY Cho RN, BSN  [facility]

## 2022-01-10 NOTE — TELEPHONE ENCOUNTER
Now 14 days since positive testing.    Which inhaler was she given - ? Albuterol?  Is she monitoring her oxygen content?  If not she should get an oximeter and monitor - if <90% evaluation is needed in ED.  If >90% she should limit her activity based on symptoms - if activity causes symptoms, she should stop.     If inhaler is albuterol and not helping should have a visit with provider in office or virtual with possible xray to follow.    If inhaler is steroid inhaler - this will take a few days to kick in for her.        SAMY

## 2022-01-10 NOTE — TELEPHONE ENCOUNTER
Spoke with Rosenda Cox, RN at [facility], she will route message to Providers for follow up regarding second level triage.    Iliana Saldana RN  01/10/22 4:12 PM  [facility] Nurse Advisor

## 2022-01-10 NOTE — TELEPHONE ENCOUNTER
"Patient reports that she was positive for COVID on December 27 with home test and testing site and feels that symptoms are not getting better    Has dry cough, dizziness, fatigue, weakness and rash that has been present since December 27.    Denies fever.    Was seen by a Provider today, 01/10/2022, thru [facility] and received a prescription for an inhaler and cream for rash.  Reports that the inhaler has not helped.  Has used twice today.    Rash \"pops out anywhere on my body after coughing\".    Patient reports that at night and during physical activity breathing gets worse and has been happening for the last four days.  Chest pressure and shortness of breath at night.    Wanted to be seen by [facility] for disability paperwork.    No COVID or influenza vaccine received.    According to the protocol, patient should to to ED now or PCP Triage.  Care advice given. Patient verbalizes understanding and agrees with plan of care. RN will route urgent message to care team for triage.    RN will route message to provider for 2nd level triage.     Plan: MD consult, Dr. Pati Aguila care team  Routed at 3:30PM    Iliana Saldana RN  01/10/22 3:25 PM  [facility] Nurse Advisor     COVID 19 Nurse Triage Plan/Patient Instructions    Please be aware that novel coronavirus (COVID-19) may be circulating in the community. If you develop symptoms such as fever, cough, or SOB or if you have concerns about the presence of another infection including coronavirus (COVID-19), please contact your health care provider or visit [email or web address].     Disposition/Instructions    In-Person Visit with provider recommended. Reference Visit Selection Guide.    Thank you for taking steps to prevent the spread of this virus.  o Limit your contact with others.  o Wear a simple mask to cover your cough.  o Wash your hands well and often.    Resources    [facility]: About COVID-19: " [email or web address]    CDC: What to Do If You're Sick: www.cdc.gov/coronavirus/2019-ncov/about/steps-when-sick.html    CDC: Ending Home Isolation: www.cdc.gov/coronavirus/2019-ncov/hcp/disposition-in-home-patients.html     CDC: Caring for Someone: www.cdc.gov/coronavirus/2019-ncov/if-you-are-sick/care-for-someone.html     [facility]: Interim Guidance for Hospital Discharge to Home: www.health.[facility].[place]./diseases/coronavirus/hcp/hospdischarge.pdf    [facility] clinical trials (COVID-19 research studies): clinicalaffairs.[facility]-clinical-trials     Below are the COVID-19 hotlines at the [place] [facility] ([facility]). Interpreters are available.   o For health questions: Call 895-853-8671 or 1-664.113.6345 (7 a.m. to 7 p.m.)  o For questions about schools and childcare: Call 284-229-1686 or 1-878.792.8431 (7 a.m. to 7 p.m.)     Reason for Disposition    MILD difficulty breathing (e.g., minimal/no SOB at rest, SOB with walking, pulse <100)    Additional Information    Negative: SEVERE difficulty breathing (e.g., struggling for each breath, speaks in single words)    Negative: Difficult to awaken or acting confused (e.g., disoriented, slurred speech)    Negative: Bluish (or gray) lips or face now    Negative: Shock suspected (e.g., cold/pale/clammy skin, too weak to stand, low BP, rapid pulse)    Negative: Sounds like a life-threatening emergency to the triager    Negative: [1] COVID-19 exposure AND [2] no symptoms    Negative: COVID-19 vaccine reaction suspected (e.g., fever, headache, muscle aches) occurring 1 to 3 days after getting vaccine    Negative: COVID-19 vaccine, questions about    Negative: [1] Lives with someone known to have influenza (flu test positive) AND [2] flu-like symptoms (e.g., cough, runny nose, sore throat, SOB; with or without fever)    Negative: [1] Adult with possible COVID-19 symptoms AND [2] triager concerned about severity of symptoms or other causes     Negative: COVID-19 and breastfeeding, questions about    Negative: SEVERE or constant chest pain or pressure (Exception: mild central chest pain, present only when coughing)    Negative: MODERATE difficulty breathing (e.g., speaks in phrases, SOB even at rest, pulse 100-120)    Negative: [1] Headache AND [2] stiff neck (can't touch chin to chest)    Protocols used: CORONAVIRUS (COVID-19) DIAGNOSED OR [id number] 8.25.2021

## 2022-01-11 ENCOUNTER — OFFICE VISIT (OUTPATIENT)
Dept: FAMILY MEDICINE | Facility: CLINIC | Age: 42
End: 2022-01-11
Payer: COMMERCIAL

## 2022-01-11 VITALS
TEMPERATURE: 98.2 F | DIASTOLIC BLOOD PRESSURE: 70 MMHG | OXYGEN SATURATION: 98 % | WEIGHT: 116.2 LBS | SYSTOLIC BLOOD PRESSURE: 115 MMHG | BODY MASS INDEX: 23.27 KG/M2 | HEART RATE: 63 BPM

## 2022-01-11 DIAGNOSIS — U07.1 INFECTION DUE TO 2019 NOVEL CORONAVIRUS: Primary | ICD-10-CM

## 2022-01-11 DIAGNOSIS — R21 RASH: ICD-10-CM

## 2022-01-11 PROCEDURE — 99213 OFFICE O/P EST LOW 20 MIN: CPT | Performed by: FAMILY MEDICINE

## 2022-01-11 RX ORDER — CETIRIZINE HYDROCHLORIDE 10 MG/1
10 TABLET ORAL DAILY
Qty: 30 TABLET | Refills: 3 | Status: SHIPPED | OUTPATIENT
Start: 2022-01-11

## 2022-01-11 RX ORDER — KETOCONAZOLE 20 MG/G
CREAM TOPICAL DAILY
Qty: 60 G | Refills: 3 | Status: SHIPPED | OUTPATIENT
Start: 2022-01-11

## 2022-01-11 RX ORDER — ALBUTEROL SULFATE 90 UG/1
2 AEROSOL, METERED RESPIRATORY (INHALATION)
COMMUNITY
Start: 2022-01-10

## 2022-01-11 RX ORDER — TRIAMCINOLONE ACETONIDE 1 MG/G
CREAM TOPICAL
COMMUNITY
Start: 2022-01-10 | End: 2022-01-24

## 2022-01-11 ASSESSMENT — PAIN SCALES - GENERAL: PAINLEVEL: MODERATE PAIN (4)

## 2022-01-11 NOTE — PROGRESS NOTES
Boris Bronw is a 41 year old who presents for the following health issues:    HPI       Acute Illness  Acute illness concerns: Fatigue, sob, cough rash  Onset/Duration: x 2 weeks ago  Symptoms:  Fever: no  Chills/Sweats: YES  Headache (location?): YES  Sinus Pressure: YES  Conjunctivitis:  No  Ear Pain: clogged  Rhinorrhea: YES  Congestion: no  Sore Throat: YES  Cough: YES  Wheeze: YES  Decreased Appetite: no  Nausea: YES  Vomiting: no  Diarrhea: YES  Dysuria/Freq.: no  Dysuria or Hematuria: no  Fatigue/Achiness: YES  Sick/Strep Exposure: no  Therapies tried and outcome: Tylenol, herbal tea    Review of Systems   Constitutional, HEENT, cardiovascular, pulmonary, GI, , musculoskeletal, neuro, skin, endocrine and psych systems are negative, except as otherwise noted.      Objective    /70 (BP Location: Right arm, Patient Position: Sitting, Cuff Size: Adult Regular)   Pulse 63   Temp 98.2  F (36.8  C) (Oral)   Wt 52.7 kg (116 lb 3.2 oz)   LMP 12/27/2021 (Exact Date)   SpO2 98%   BMI 23.27 kg/m    Body mass index is 23.27 kg/m .  Physical Exam   GENERAL: healthy, alert and no distress  NECK: no adenopathy, no asymmetry, masses, or scars and thyroid normal to palpation  RESP: lungs clear to auscultation - no rales, rhonchi or wheezes  CV: regular rate and rhythm, normal S1 S2, no S3 or S4, no murmur, click or rub, no peripheral edema and peripheral pulses strong  ABDOMEN: soft, nontender, no hepatosplenomegaly, no masses and bowel sounds normal  MS: no gross musculoskeletal defects noted, no edema    A/P:  (U07.1) Infection due to 2019 novel coronavirus  (primary encounter diagnosis)  Comment:   Plan: resolving. VSS. Exam normal today. Patient wants a couple more weeks to recover. Okay with this. Work letter for excuse from 12/27/2021 and returning to work on 1/24/2022.    (R21) Rash  Comment:   Plan: ketoconazole (NIZORAL) 2 % external cream,         cetirizine (ZYRTEC) 10 MG tablet             Harris Hernandez MD

## 2022-01-11 NOTE — PATIENT INSTRUCTIONS
At [facility], we strive to deliver an exceptional experience to you, every time we see you. If you receive a survey, please complete it as we do value your feedback.  If you have MyChart, you can expect to receive results automatically within 24 hours of their completion.  Your provider will send a note interpreting your results as well.   If you do not have MyChart, you should receive your results in about a week by mail.    Your care team:                            Family Medicine Internal Medicine   MD Caleb Lopez MD Shantel Branch-Fleming, MD Srinivasa Vaka, MD Katya Belousova, PAVANESSA Blount, APRN CNP    Harris Hernandez, MD Pediatrics   Omid Wolfe, PAVANESSA Benavides, CNP MD Anjana Choi APRN CNP   MD Karla Singh MD Deborah Mielke, MD Gena Aguila, APRN [facility]      Clinic hours: Monday - Thursday 7 am-6 pm; Fridays 7 am-5 pm.   Urgent care: Monday - Friday 10 am- 8 pm; Saturday and Sunday 9 am-5 pm.    Clinic: [phone]       [facility]: Monday - Thursday 8 am - 7 pm; Friday 8 am - 6 pm  [facility]: [phone]     Use [email or web address] for 24/7 diagnosis and treatment of dozens of conditions.

## 2022-01-11 NOTE — LETTER
[address] [address]  Phone: [phone]    January 11, 2022        Kevin Crowe  [address] [place] [address]          To whom it may concern:    RE: Kevin Crowe    Patient was seen and treated today at our clinic and missed work.  Patient may return to work 1/24/2022 with the following:  No working or lifting restrictions    Please contact me for questions or concerns.      Sincerely,        Harris Hernandez MD

## 2022-01-23 ENCOUNTER — MYC MEDICAL ADVICE (OUTPATIENT)
Dept: FAMILY MEDICINE | Facility: CLINIC | Age: 42
End: 2022-01-23
Payer: COMMERCIAL

## 2022-01-23 NOTE — LETTER
[address] [address]  Phone: [phone]    January 25, 2022        Kevin Crowe  [address] [place] [address]          To whom it may concern:    RE: Kevin Crowe    Patient was treated at our clinic and missed work.  Patient may return to work 2/07/2022 with the following:  No working or lifting restrictions    Please contact me for questions or concerns.      Sincerely,        Harris Hernandez MD

## 2022-01-23 NOTE — LETTER
[address] [address]  Phone: [phone]    January 25, 2022        Kevin Crowe  [address] [facility] [address] [facility] [address]          To whom it may concern:    RE: Kevin Crowe    Patient was treated at our clinic and missed work.  Patient may return to work 1/26/2022 with the following:  No working or lifting restrictions    Please contact me for questions or concerns.      Sincerely,        Harris Hernandez MD

## 2022-01-24 NOTE — TELEPHONE ENCOUNTER
Pt requesting letter for job.    She is still out of work due to covid.      Pt had appointment on 1/11/22. Had letter written to excuse form work from 12/27/21 to 1/24/22.      Routing to provider to review and advise.      Dorcas Grande RN  [facility]

## 2022-01-25 ENCOUNTER — NURSE TRIAGE (OUTPATIENT)
Dept: NURSING | Facility: CLINIC | Age: 42
End: 2022-01-25
Payer: COMMERCIAL

## 2022-01-25 NOTE — TELEPHONE ENCOUNTER
"See additional "[employer]hart message below.  Patient was already given extension on RTW letter, to be out yesterday and today. Patient notifed of updated letter.    Patient is now messaging to request even more time off, until 2/7/22 due to persisting COVID symptoms. Positive for COVID end of December.  Notes she has some upcoming provider visits for check up, but is scheduled to see Dr. Berrios at [facility] on 1/28/22 and has a \"Consulting Specialties\" visit for tomorrow, this appears to be a [facility] program, \"Well at Work\" through [facility] [employer] (pt has had visits with this group in the past, see Chart Review under Consulting Specialties OV's). Has not scheduled a follow up with Dr. Harris Hernandez.      Routing to provider to update.      Mica Camargo RN  [facility]      "

## 2022-01-25 NOTE — TELEPHONE ENCOUNTER
Patient is still having lingering symptoms from Covid: body aches, shortness of breath with exertion, fatigue, nausea, dizziness.     Patient states she feels shaky at night and is concerned about diabetes as she was borderline diabetic prior to getting Covid.    No fever or chest pain.    Per protocol, recommendations are for routine office f/u appointment. Patient has an appointment scheduled for Friday.  Care advice given. Advised patient to call back if they develop any new or worsening symptoms. Patient verbalizes understanding and agrees with plan of care.    Julissa Foss RN  01/25/22 12:40 PM  [facility] Nurse Advisor      Reason for Disposition    [1] PERSISTING SYMPTOMS OF COVID-19 AND [2] symptoms SAME AND [3] medical visit for COVID-19 in past 2 weeks    Additional Information    Negative: [1] PERSISTING SYMPTOMS OF COVID-19 AND [2] NO medical visit for COVID-19 in past 2 weeks    Negative: [1] Caller has NON-URGENT question AND [2] triager unable to answer    Negative: [1] PERSISTING SYMPTOMS OF COVID-19 AND [2] symptoms WORSE    Negative: [1] PERSISTING SYMPTOMS OF COVID-19 AND [2] NEW symptom AND [3] could be serious    Negative: [1] Caller has URGENT question AND [2] triager unable to answer question    Negative: [1] MILD difficulty breathing (e.g., minimal/no SOB at rest, SOB with walking, pulse <100) AND [2] new-onset    Negative: Oxygen level (e.g., pulse oximetry) 91 to 94 percent    Negative: MODERATE difficulty breathing (e.g., speaks in phrases, SOB even at rest, pulse 100-120)    Negative: [1] Drinking very little AND [2] dehydration suspected (e.g., no urine > 12 hours, very dry mouth, very lightheaded)    Negative: Patient sounds very sick or weak to the triager    Negative: [1] MODERATE difficulty breathing (e.g., speaks in phrases, SOB even at rest, pulse 100-120) AND [2] new-onset or WORSE    Negative: [1] MODERATE difficulty breathing AND [2] oxygen level (e.g., pulse oximetry)  91 to 94 percent    Negative: Oxygen level (e.g., pulse oximetry) 90 percent or lower    Negative: [1] Typical COVID-19 symptoms AND [2] lasting less than 3 weeks    Negative: [1] Chest pain, pressure, or tightness AND [2] new-onset or worsening    Negative: [1] Fever AND [2] new-onset or worsening    Negative: SEVERE difficulty breathing (e.g., struggling for each breath, speaks in single words)    Negative: [1] SEVERE weakness (e.g., can't stand or can barely walk) AND [2] new-onset or WORSE    Negative: Difficult to awaken or acting confused (e.g., disoriented, slurred speech)    Negative: Bluish (or gray) lips or face now    Negative: Sounds like a life-threatening emergency to the triager    Protocols used: CORONAVIRUS (COVID-19) PERSISTING SYMPTOMS FOLLOW-UP CALL-A- 8.25.2021

## 2022-02-01 NOTE — TELEPHONE ENCOUNTER
Patient called back and explained that we need an YUDITH to be able to have permission to fax over records to [facility]. Patient took down the fax #.  [facility]  2nd Floor  Primary Care

## 2022-02-10 ENCOUNTER — TELEPHONE (OUTPATIENT)
Dept: FAMILY MEDICINE | Facility: CLINIC | Age: 42
End: 2022-02-10
Payer: COMMERCIAL

## 2022-02-10 NOTE — TELEPHONE ENCOUNTER
Patient called: Ismael LOPEZA forms have not received forms yet; she came in to sign a YUDITH on approx. 2/3/22. And states this has been weeks.      Patient feels this hasn't been handled; and It's very important for her employment and is asking that we please look into this so Ismael can receive her forms. She asked that I route this to one person if possible as she has had multiple people try to resolve this madder and feels she is getting the run-around.      Please inform patient:  [phone]

## 2022-02-14 ENCOUNTER — TELEPHONE (OUTPATIENT)
Dept: FAMILY MEDICINE | Facility: CLINIC | Age: 42
End: 2022-02-14
Payer: COMMERCIAL

## 2022-02-14 NOTE — TELEPHONE ENCOUNTER
Patient calling requesting a Letter for work to excuse for dates 2/13/22-2/21/22. Reason still feeling fatigued, dizzy, stomach ache and muscle aches can't stand longer then 3 hours then dizzy and shaky sets in, post COVID. Patient called in to work on 2/13 and 2/14, requesting to return 2/22/22.    Fax Letter to: [facility] [phone].   Can call patient and leave a detailed message at: [phone]    Graciela Astorga

## 2022-02-14 NOTE — LETTER
[address] [address]  Phone: [phone]    February 15, 2022        Kevin Crowe  [address] [place] [address]          To whom it may concern:    RE: Kevin Crowe    Patient was seen and treated at our clinic and missed work.  Patient may return to work 2/22/2022 with the following:  No working or lifting restrictions  Please excuse patient from work from 2/13/2022 to 2/21/2022.    Please contact me for questions or concerns.      Sincerely,        Harris Hernandez MD

## 2022-03-19 ENCOUNTER — HEALTH MAINTENANCE LETTER (OUTPATIENT)
Age: 42
End: 2022-03-19

## 2022-09-03 ENCOUNTER — HEALTH MAINTENANCE LETTER (OUTPATIENT)
Age: 42
End: 2022-09-03

## 2023-04-29 ENCOUNTER — HEALTH MAINTENANCE LETTER (OUTPATIENT)
Age: 43
End: 2023-04-29

## 2023-09-30 ENCOUNTER — HEALTH MAINTENANCE LETTER (OUTPATIENT)
Age: 43
End: 2023-09-30

## 2024-04-09 ENCOUNTER — NURSE TRIAGE (OUTPATIENT)
Dept: FAMILY MEDICINE | Facility: CLINIC | Age: 44
End: 2024-04-09

## 2024-04-09 NOTE — TELEPHONE ENCOUNTER
"Patient calling requesting a letter for work as she has called in for 3 days due to stomach bug.   RN concerned for some dehydration due to patient reporting some dizziness (can walk normally, but not for a long period of time), nausea, fatigue, and sweaty. RN advise patient to ER for further evaluation and possible IV fluids. Pt agrees with plan. She reports she'll have one of your son take her to the ER. 911 precautions reviewed.     Gena Cho RN    [facility]                Reason for Disposition   Drinking very little and has signs of dehydration (e.g., no urine > 12 hours, very dry mouth, very lightheaded)    Additional Information   Negative: High-risk adult (e.g., brain tumor, V-P shunt, hernia)   Negative: SEVERE vomiting (e.g., 6 or more times/day)  (Exception: Patient sounds well, is drinking liquids, does not sound dehydrated, and vomiting has lasted less than 24 hours.)   Negative: MODERATE vomiting (e.g., 3 - 5 times/day) and age > 60 years   Negative: Constant abdominal pain lasting > 2 hours   Negative: Vomiting red blood or black (coffee ground) material   Negative: Vomiting and hernia is more painful or swollen than usual   Negative: Recent head injury (within 3 days)   Negative: Recent abdominal injury (within 7 days)   Negative: Insulin-dependent diabetes and glucose > 240 mg/dL (13 mmol/L)   Negative: Severe pain in one eye   Negative: Shock suspected (e.g., cold/pale/clammy skin, too weak to stand, low BP, rapid pulse)   Negative: Difficult to awaken or acting confused (e.g., disoriented, slurred speech)   Negative: Sounds like a life-threatening emergency to the triager   Negative: Vomiting occurs only while coughing   Negative: Pregnant < 20 Weeks and nausea/vomiting began in early pregnancy (i.e., 4-8 weeks pregnant)   Negative: Chest pain   Negative: Headache is main symptom    Answer Assessment - Initial Assessment Questions  1. VOMITING SEVERITY: \"How many times have you vomited in " "the past 24 hours?\"      - MILD:  1 - 2 times/day     - MODERATE: 3 - 5 times/day, decreased oral intake without significant weight loss or symptoms of dehydration     - SEVERE: 6 or more times/day, vomits everything or nearly everything, with significant weight loss, symptoms of dehydration       Once in the AM.   2. ONSET: \"When did the vomiting begin?\"       4/5 on Friday  3. FLUIDS: \"What fluids or food have you vomited up today?\" \"Have you been able to keep any fluids down?\"      None. Yes, can keep fluids down. After eating feels very nauseous   4. ABDOMEN PAIN: \"Are your having any abdomen pain?\" If Yes : \"How bad is it and what does it feel like?\" (e.g., crampy, dull, intermittent, constant)       Yes, on Saturday night. Lingering. Right now 6/10 on pain scale, states, Cramping. Other day felt something hard food stuck on top. Sharp pain      5. DIARRHEA: \"Is there any diarrhea?\" If Yes, ask: \"How many times today?\"       Denies today-yes was having diarrhea. No blood     6. CONTACTS: \"Is there anyone else in the family with the same symptoms?\"       Spouse beginning of last week with nausea. Oldest son started on Saturday AM. Sunday youngest with abdominal pain with eating.     7. CAUSE: \"What do you think is causing your vomiting?\"      Stomach virus   8. HYDRATION STATUS: \"Any signs of dehydration?\" (e.g., dry mouth [not only dry lips], too weak to stand) \"When did you last urinate?\"      Last urinated- peeing more than usual. Right now.   Denies dry mouth, feels too weak to stand walk cannot stand too long.   9. OTHER SYMPTOMS: \"Do you have any other symptoms?\" (e.g., fever, headache, vertigo, vomiting blood or coffee grounds, recent head injury)      No fever,   Affirms headaches, dizziness, no vomiting or coffee grounds.   Denies vertigo, recent head injury.   Denies chest pain now. Epigastric pain Friday and Saturday of last week.     10. PREGNANCY: \"Is there any chance you are pregnant?\" \"When was " "your last menstrual period?\"        No; period started last week    Protocols used: Vomiting-A-OH    "

## 2024-07-06 ENCOUNTER — HEALTH MAINTENANCE LETTER (OUTPATIENT)
Age: 44
End: 2024-07-06

## 2024-11-05 ENCOUNTER — OFFICE VISIT (OUTPATIENT)
Dept: FAMILY MEDICINE | Facility: CLINIC | Age: 44
End: 2024-11-05
Payer: COMMERCIAL

## 2024-11-05 VITALS
OXYGEN SATURATION: 97 % | BODY MASS INDEX: 26.04 KG/M2 | SYSTOLIC BLOOD PRESSURE: 103 MMHG | HEART RATE: 55 BPM | DIASTOLIC BLOOD PRESSURE: 48 MMHG | WEIGHT: 130 LBS | TEMPERATURE: 98 F

## 2024-11-05 DIAGNOSIS — Z13.1 SCREENING FOR DIABETES MELLITUS: ICD-10-CM

## 2024-11-05 DIAGNOSIS — R10.2 ACUTE SUPRAPUBIC PAIN: ICD-10-CM

## 2024-11-05 DIAGNOSIS — R19.7 DIARRHEA, UNSPECIFIED TYPE: Primary | ICD-10-CM

## 2024-11-05 DIAGNOSIS — H10.31 ACUTE CONJUNCTIVITIS OF RIGHT EYE, UNSPECIFIED ACUTE CONJUNCTIVITIS TYPE: ICD-10-CM

## 2024-11-05 DIAGNOSIS — R42 DIZZINESS: ICD-10-CM

## 2024-11-05 DIAGNOSIS — Z11.59 NEED FOR HEPATITIS C SCREENING TEST: ICD-10-CM

## 2024-11-05 DIAGNOSIS — Z13.220 SCREENING FOR LIPID DISORDERS: ICD-10-CM

## 2024-11-05 DIAGNOSIS — Z11.4 SCREENING FOR HIV (HUMAN IMMUNODEFICIENCY VIRUS): ICD-10-CM

## 2024-11-05 LAB
ALBUMIN UR-MCNC: NEGATIVE MG/DL
APPEARANCE UR: CLEAR
BACTERIA #/AREA URNS HPF: ABNORMAL /HPF
BASOPHILS # BLD AUTO: 0.1 10E3/UL (ref 0–0.2)
BASOPHILS NFR BLD AUTO: 1 %
BILIRUB UR QL STRIP: NEGATIVE
COLOR UR AUTO: YELLOW
EOSINOPHIL # BLD AUTO: 0.1 10E3/UL (ref 0–0.7)
EOSINOPHIL NFR BLD AUTO: 2 %
ERYTHROCYTE [DISTWIDTH] IN BLOOD BY AUTOMATED COUNT: 13.9 % (ref 10–15)
GLUCOSE UR STRIP-MCNC: NEGATIVE MG/DL
HCG UR QL: NEGATIVE
HCT VFR BLD AUTO: 36.4 % (ref 35–47)
HGB BLD-MCNC: 11.6 G/DL (ref 11.7–15.7)
HGB UR QL STRIP: NEGATIVE
IMM GRANULOCYTES # BLD: 0 10E3/UL
IMM GRANULOCYTES NFR BLD: 0 %
KETONES UR STRIP-MCNC: NEGATIVE MG/DL
LEUKOCYTE ESTERASE UR QL STRIP: NEGATIVE
LYMPHOCYTES # BLD AUTO: 1.9 10E3/UL (ref 0.8–5.3)
LYMPHOCYTES NFR BLD AUTO: 31 %
MCH RBC QN AUTO: 23.9 PG (ref 26.5–33)
MCHC RBC AUTO-ENTMCNC: 31.9 G/DL (ref 31.5–36.5)
MCV RBC AUTO: 75 FL (ref 78–100)
MONOCYTES # BLD AUTO: 0.4 10E3/UL (ref 0–1.3)
MONOCYTES NFR BLD AUTO: 7 %
NEUTROPHILS # BLD AUTO: 3.4 10E3/UL (ref 1.6–8.3)
NEUTROPHILS NFR BLD AUTO: 58 %
NITRATE UR QL: NEGATIVE
PH UR STRIP: 6 [PH] (ref 5–7)
PLATELET # BLD AUTO: 265 10E3/UL (ref 150–450)
RBC # BLD AUTO: 4.86 10E6/UL (ref 3.8–5.2)
RBC #/AREA URNS AUTO: ABNORMAL /HPF
SP GR UR STRIP: 1.02 (ref 1–1.03)
SQUAMOUS #/AREA URNS AUTO: ABNORMAL /LPF
UROBILINOGEN UR STRIP-ACNC: 0.2 E.U./DL
WBC # BLD AUTO: 6 10E3/UL (ref 4–11)
WBC #/AREA URNS AUTO: ABNORMAL /HPF

## 2024-11-05 PROCEDURE — 36415 COLL VENOUS BLD VENIPUNCTURE: CPT | Performed by: FAMILY MEDICINE

## 2024-11-05 PROCEDURE — 80061 LIPID PANEL: CPT | Performed by: FAMILY MEDICINE

## 2024-11-05 PROCEDURE — 99214 OFFICE O/P EST MOD 30 MIN: CPT | Performed by: FAMILY MEDICINE

## 2024-11-05 PROCEDURE — 81001 URINALYSIS AUTO W/SCOPE: CPT | Performed by: FAMILY MEDICINE

## 2024-11-05 PROCEDURE — 85025 COMPLETE CBC W/AUTO DIFF WBC: CPT | Performed by: FAMILY MEDICINE

## 2024-11-05 PROCEDURE — 86803 HEPATITIS C AB TEST: CPT | Performed by: FAMILY MEDICINE

## 2024-11-05 PROCEDURE — 82947 ASSAY GLUCOSE BLOOD QUANT: CPT | Performed by: FAMILY MEDICINE

## 2024-11-05 PROCEDURE — 81025 URINE PREGNANCY TEST: CPT | Performed by: FAMILY MEDICINE

## 2024-11-05 PROCEDURE — 87389 HIV-1 AG W/HIV-1&-2 AB AG IA: CPT | Performed by: FAMILY MEDICINE

## 2024-11-05 RX ORDER — CIPROFLOXACIN HYDROCHLORIDE 3.5 MG/ML
SOLUTION/ DROPS TOPICAL
Qty: 2.5 ML | Refills: 0 | Status: SHIPPED | OUTPATIENT
Start: 2024-11-05 | End: 2024-11-12

## 2024-11-05 RX ORDER — MECLIZINE HYDROCHLORIDE 25 MG/1
25 TABLET ORAL 3 TIMES DAILY PRN
Qty: 40 TABLET | Refills: 0 | Status: SHIPPED | OUTPATIENT
Start: 2024-11-05

## 2024-11-05 ASSESSMENT — ENCOUNTER SYMPTOMS
NAUSEA: 1
HEADACHES: 1
EYE PAIN: 1

## 2024-11-05 ASSESSMENT — PATIENT HEALTH QUESTIONNAIRE - PHQ9
SUM OF ALL RESPONSES TO PHQ QUESTIONS 1-9: 10
10. IF YOU CHECKED OFF ANY PROBLEMS, HOW DIFFICULT HAVE THESE PROBLEMS MADE IT FOR YOU TO DO YOUR WORK, TAKE CARE OF THINGS AT HOME, OR GET ALONG WITH OTHER PEOPLE: NOT DIFFICULT AT ALL
SUM OF ALL RESPONSES TO PHQ QUESTIONS 1-9: 10

## 2024-11-05 ASSESSMENT — PAIN SCALES - GENERAL: PAINLEVEL_OUTOF10: NO PAIN (0)

## 2024-11-05 NOTE — PROGRESS NOTES
Assessment & Plan     (R19.7) Diarrhea, unspecified type  (primary encounter diagnosis)  Comment: Most likely this is viral.  Plan: Enteric Bacteria and Virus Panel PCR, Fecal         Lactoferrin, CBC with platelets and         differential, CANCELED: CBC with Platelets &         Differential        Return in about 6 days (around 11/11/2024) for recheck if symptoms fail to resolve by then, or sooner if symptoms worsen.      (R10.2) Acute suprapubic pain  Comment: No evidence for UTI  Plan: HCG Qual, Urine ([id number]), CBC with platelets         and differential, UA with Microscopic reflex to        Culture - lab collect, UA Microscopic with         Reflex to Culture, CANCELED: UA with         Microscopic reflex to Culture - lab collect,         CANCELED: CBC with Platelets & Differential            (R42) Dizziness  Comment: also likely secondary to viral infection  Plan: meclizine (ANTIVERT) 25 MG tablet        Return in about 6 days (around 11/11/2024) for recheck if symptoms fail to resolve by then, or sooner if symptoms worsen.      (H10.31) Acute conjunctivitis of right eye, unspecified acute conjunctivitis type  Comment:   Plan: ciprofloxacin (CILOXAN) 0.3 % ophthalmic         solution        Follow up with eye doctor is symptoms worsen despite treatment or do not resolve within 7 days.     (Z11.59) Need for hepatitis C screening test  Comment: indications for screening discussed with the patient   Plan: Hepatitis C Screen Reflex to HCV RNA Quant and         Genotype            (Z11.4) Screening for HIV (human immunodeficiency virus)  Comment: indications for screening discussed with the patient   Plan: HIV Antigen Antibody Combo            (Z13.220) Screening for lipid disorders  Comment: indications for screening discussed with the patient  Plan: Lipid panel reflex to direct LDL Non-fasting            (Z13.1) Screening for diabetes mellitus  Comment: Patient reports being prediabetic  Plan: Glucose         "    Boris Brown is a 44 year old, presenting for the following health issues:  Headache, Abdominal Pain, Dizziness, Eye Problem, and Nausea        11/5/2024    11:13 AM   Additional Questions   Roomed by Valeria         11/5/2024   Forms   Any forms needing to be completed Yes        History of Present Illness       Headaches:   Since the patient's last clinic visit, headaches are: worsened  The patient is getting headaches:  2 a week  She is not able to do normal daily activities when she has a migraine.  The patient is taking the following rescue/relief medications:  No rescue/relief medications   Patient states \"I get no relief\" from the rescue/relief medications.   The patient is taking the following medications to prevent migraines:  No medications to prevent migraines  In the past 4 weeks, the patient has gone to an Urgent Care or Emergency Room 0 times times due to headaches.    Reason for visit:  Sick with stomach flu headaches feeling nauseous and diarrea  Symptoms include:  Stomach pain  Symptom intensity:  Moderate  Symptom progression:  Worsening  Had these symptoms before:  No  What makes it worse:  Dizzness  What makes it better:  No   She is taking medications regularly.     First day of symptoms was 10/30/24 and she felt dizzy. The nausea is lingering and the patient complains of abdominal pain. She reports mucous in her urine and had an episode of diarrhea last night. She has felt very fatigued and dizzy. She woke this morning with her 2 eye swollen shut and painful to the touch.         Objective    /48 (BP Location: Left arm, Patient Position: Sitting, Cuff Size: Adult Regular)   Pulse 55   Temp 98  F (36.7  C) (Temporal)   Wt 59 kg (130 lb)   LMP 10/28/2024 (Approximate)   SpO2 97%   BMI 26.04 kg/m    Body mass index is 26.04 kg/m .  Physical Exam   GENERAL: alert and no distress  EYES: PERRL, EOMI, sclera white, mild diffuse conjunctival injection of the right eye, no corneal " opacity, right upper eyelid mildly edematous    HENT: ear canals and TM's normal, nose and mouth without ulcers or lesions  NECK: no adenopathy, no asymmetry, masses, or scars  RESP: lungs clear to auscultation - no rales, rhonchi or wheezes  CV: regular rate and rhythm, normal S1 S2, no S3 or S4, no murmur, click or rub, no peripheral edema  ABDOMEN: soft, suprapubic tenderness present, no hepatosplenomegaly, no masses and bowel sounds normal  MS: no gross musculoskeletal defects noted, no edema  SKIN: no suspicious lesions or rashes  NEURO: Normal strength and tone, mentation intact and speech normal  PSYCH: mentation appears normal, affect normal/bright    Results for orders placed or performed in visit on 11/05/24 (from the past 24 hours)   HCG Qual, Urine ([id number])   Result Value Ref Range    hCG Urine Qualitative Negative Negative   CBC with platelets and differential    Narrative    The following orders were created for panel order CBC with platelets and differential.  Procedure                               Abnormality         Status                     ---------                               -----------         ------                     CBC with platelets and d...[[id number]]  Abnormal            Final result                 Please view results for these tests on the individual orders.   UA with Microscopic reflex to Culture - lab collect    Specimen: Urine, Midstream   Result Value Ref Range    Color Urine Yellow Colorless, Straw, Light Yellow, Yellow    Appearance Urine Clear Clear    Glucose Urine Negative Negative mg/dL    Bilirubin Urine Negative Negative    Ketones Urine Negative Negative mg/dL    Specific Gravity Urine 1.020 1.003 - 1.035    Blood Urine Negative Negative    pH Urine 6.0 5.0 - 7.0    Protein Albumin Urine Negative Negative mg/dL    Urobilinogen Urine 0.2 0.2, 1.0 E.U./dL    Nitrite Urine Negative Negative    Leukocyte Esterase Urine Negative Negative   CBC with platelets and  differential   Result Value Ref Range    WBC Count 6.0 4.0 - 11.0 10e3/uL    RBC Count 4.86 3.80 - 5.20 10e6/uL    Hemoglobin 11.6 (L) 11.7 - 15.7 g/dL    Hematocrit 36.4 35.0 - 47.0 %    MCV 75 (L) 78 - 100 fL    MCH 23.9 (L) 26.5 - 33.0 pg    MCHC 31.9 31.5 - 36.5 g/dL    RDW 13.9 10.0 - 15.0 %    Platelet Count 265 150 - 450 10e3/uL    % Neutrophils 58 %    % Lymphocytes 31 %    % Monocytes 7 %    % Eosinophils 2 %    % Basophils 1 %    % Immature Granulocytes 0 %    Absolute Neutrophils 3.4 1.6 - 8.3 10e3/uL    Absolute Lymphocytes 1.9 0.8 - 5.3 10e3/uL    Absolute Monocytes 0.4 0.0 - 1.3 10e3/uL    Absolute Eosinophils 0.1 0.0 - 0.7 10e3/uL    Absolute Basophils 0.1 0.0 - 0.2 10e3/uL    Absolute Immature Granulocytes 0.0 <=0.4 10e3/uL   UA Microscopic with Reflex to Culture   Result Value Ref Range    Bacteria Urine Few (A) None Seen /HPF    RBC Urine 0-2 0-2 /HPF /HPF    WBC Urine 0-5 0-5 /HPF /HPF    Squamous Epithelials Urine Few (A) None Seen /LPF    Narrative    Urine Culture not indicated        This document serves as a record of the services and decisions personally performed and made by Dr. Bravo. It was created on his behalf by Nya Jackson, a trained medical scribe. The creation of this document is based the provider's statements to the medical scribe.  Nya Jackson, 1:01 PM         Signed Electronically by: Lenny Bravo MD

## 2024-11-05 NOTE — PATIENT INSTRUCTIONS
At [facility], we strive to deliver an exceptional experience to you, every time we see you. If you receive a survey, please let us know what we are doing well and/or what we could improve upon, as we do value your feedback.  If you have MyChart, you can expect to receive results automatically within 24 hours of their completion.  Your provider will send a note interpreting your results as well.   If you do not have MyChart, you should receive your results in about a week by mail.    Your care team:                            Family Medicine Internal Medicine   MD Caleb Lopez, MD Emily Chahal, MD Quincy Hernandez, MD Pushpa Lieberman, PABrayanC    Harris Hernandez, MD Pediatrics   Serina Mccrary, MD Dahlia Vega, MD Gena Aguila, APRN CNP Anjana Higginbotham APRN CNP   MD Karla De La Paz, MD Elana Parra, CNP     Yassine Ugalde, CNP Same-Day Provider (No follow-up visits)   SENIA Olivera, DNP Annamaria Bridges, SENIA Deng, FNP, BC ARETHA ColonC     Clinic hours: Monday - Thursday 7 am-6 pm; Fridays 7 am-5 pm.   Urgent care: Monday - Friday 10 am- 8 pm; Saturday and Sunday 9 am-5 pm.    Clinic: [phone]       [facility]: Monday - Thursday 8 am - 7 pm; Friday 8 am - 6 pm  [facility]: [phone]

## 2024-11-05 NOTE — LETTER
November 5, 2024      Kevin Crowe  [address]  [place] [facility] [address]        To Whom It May Concern:    Kevin Crowe  was seen on 11/5/24.  Please excuse her until 11/10/24 due to illness.        Sincerely,        Lenny Bravo MD

## 2024-11-06 LAB
CHOLEST SERPL-MCNC: 206 MG/DL
FASTING STATUS PATIENT QL REPORTED: NO
FASTING STATUS PATIENT QL REPORTED: NO
GLUCOSE SERPL-MCNC: 94 MG/DL (ref 70–99)
HCV AB SERPL QL IA: NONREACTIVE
HDLC SERPL-MCNC: 68 MG/DL
HIV 1+2 AB+HIV1 P24 AG SERPL QL IA: NONREACTIVE
LDLC SERPL CALC-MCNC: 103 MG/DL
NONHDLC SERPL-MCNC: 138 MG/DL
TRIGL SERPL-MCNC: 177 MG/DL

## 2024-11-06 PROCEDURE — 87507 IADNA-DNA/RNA PROBE TQ 12-25: CPT | Performed by: FAMILY MEDICINE

## 2024-11-08 LAB
ADV 40+41 DNA STL QL NAA+NON-PROBE: NEGATIVE
ASTRO TYP 1-8 RNA STL QL NAA+NON-PROBE: NEGATIVE
C CAYETANENSIS DNA STL QL NAA+NON-PROBE: NEGATIVE
CAMPYLOBACTER DNA SPEC NAA+PROBE: NEGATIVE
CRYPTOSP DNA STL QL NAA+NON-PROBE: NEGATIVE
E COLI O157 DNA STL QL NAA+NON-PROBE: NORMAL
E HISTOLYT DNA STL QL NAA+NON-PROBE: NEGATIVE
EAEC ASTA GENE ISLT QL NAA+PROBE: NEGATIVE
EC STX1+STX2 GENES STL QL NAA+NON-PROBE: NEGATIVE
EPEC EAE GENE STL QL NAA+NON-PROBE: NEGATIVE
ETEC LTA+ST1A+ST1B TOX ST NAA+NON-PROBE: NEGATIVE
G LAMBLIA DNA STL QL NAA+NON-PROBE: NEGATIVE
NOROVIRUS GI+II RNA STL QL NAA+NON-PROBE: NEGATIVE
P SHIGELLOIDES DNA STL QL NAA+NON-PROBE: NEGATIVE
RVA RNA STL QL NAA+NON-PROBE: NEGATIVE
SALMONELLA SP RPOD STL QL NAA+PROBE: NEGATIVE
SAPO I+II+IV+V RNA STL QL NAA+NON-PROBE: NEGATIVE
SHIGELLA SP+EIEC IPAH ST NAA+NON-PROBE: NEGATIVE
V CHOLERAE DNA SPEC QL NAA+PROBE: NEGATIVE
VIBRIO DNA SPEC NAA+PROBE: NEGATIVE
Y ENTEROCOL DNA STL QL NAA+PROBE: NEGATIVE

## 2024-12-06 ENCOUNTER — TELEPHONE (OUTPATIENT)
Dept: FAMILY MEDICINE | Facility: CLINIC | Age: 44
End: 2024-12-06
Payer: COMMERCIAL

## 2024-12-06 NOTE — TELEPHONE ENCOUNTER
Forms/Letter Request    Type of form/letter: OTHER: Work note       Do we have the form/letter: Yes: Patient needs letter excusing her from work. Tested positive on 12/03/24. Needs letter excusing her from 12/03/24-12/05/24.     Who is the form from? Needs letter from doctor.      Where did/will the form come from? Patient or family brought in       When is form/letter needed by: 12/06/24    How would you like the form/letter returned: . Will have son pick it up    Patient Notified form requests are processed in 5-7 business days:Yes    Could we send this information to you in [employer] or would you prefer to receive a phone call?:   Patient would prefer a phone call   Okay to leave a detailed message?: Yes at Cell number on file:    Telephone Information:   Mobile [phone]

## 2024-12-09 NOTE — TELEPHONE ENCOUNTER
Lenny Bravo MD  [facility] Pool2 minutes ago (2:04 PM)     RUDOLPH  I do not think we can write a letter certifying that the patient tested positive for anything, since we have not been involved in her care this month. Before this encounter, she had not contacted us about any illness.         Called pt and relayed provider's message. Pt states she will come into [facility] to be seen for this note.

## 2024-12-09 NOTE — TELEPHONE ENCOUNTER
Pt called in to state that she is still sick and is unable to go into work and she would like a work note through 12/15/24 to be off of work due to illness. Please advise.

## 2024-12-10 ENCOUNTER — OFFICE VISIT (OUTPATIENT)
Dept: URGENT CARE | Facility: URGENT CARE | Age: 44
End: 2024-12-10
Payer: COMMERCIAL

## 2024-12-10 VITALS
OXYGEN SATURATION: 98 % | HEART RATE: 63 BPM | WEIGHT: 127 LBS | TEMPERATURE: 98.3 F | RESPIRATION RATE: 16 BRPM | SYSTOLIC BLOOD PRESSURE: 115 MMHG | DIASTOLIC BLOOD PRESSURE: 67 MMHG | BODY MASS INDEX: 25.43 KG/M2

## 2024-12-10 DIAGNOSIS — R11.2 NAUSEA AND VOMITING, UNSPECIFIED VOMITING TYPE: ICD-10-CM

## 2024-12-10 DIAGNOSIS — R10.13 EPIGASTRIC PAIN: Primary | ICD-10-CM

## 2024-12-10 DIAGNOSIS — U07.1 INFECTION DUE TO 2019 NOVEL CORONAVIRUS: ICD-10-CM

## 2024-12-10 PROCEDURE — 99214 OFFICE O/P EST MOD 30 MIN: CPT | Performed by: PHYSICIAN ASSISTANT

## 2024-12-10 ASSESSMENT — PAIN SCALES - GENERAL: PAINLEVEL_OUTOF10: EXTREME PAIN (9)

## 2024-12-10 NOTE — LETTER
December 10, 2024      Kevin Crowe  [address]  [place] [address]        To Whom It May Concern:    Kevin Crowe  was seen on 12/10/2024 for illness.  Please excuse her from work 12/10 and 12/11/2024.        Sincerely,        Pati Dumont PA-C

## 2024-12-10 NOTE — PATIENT INSTRUCTIONS
44-year-old female, diagnosed with COVID 1 week ago presents for vomiting that started yesterday at 10 AM.  Cannot say how many times.  States now she is vomiting bile, no blood.  Says she has 9 out of 10 epigastric pain.  Has been taking daily ibuprofen and Tylenol since Thanksgiving, a total of 4 tablets daily of each.  Still coughing from COVID.  Limited on resources.  To ER for further evaluation and treatment..

## 2024-12-10 NOTE — PROGRESS NOTES
44-year-old female presents for vomiting since yesterday AM.  Also with epigastric pain that she rates 9/10.  Diagnosed with COVID 1 week ago.  Still coughing.  No fever.  Has been taking 4 tablets of Tylenol and 4 tablets of ibuprofen daily since Thanksgiving.  Worried she overdosed on it.  No blood in her vomit.  Cannot tell me how many times she vomited since yesterday.  States she is now vomiting bile and is very nauseated.  No diarrhea.  No dysuria, frequency, urgency.    Allergies   Allergen Reactions    Pcn [Penicillins]      Patient states no longer allergic (12/10/24).          Past Medical History:   Diagnosis Date    Headache(784.0)     Off and on for 6 years    Menstrual migraine 4/28/2014    Migraine without aura 4/28/2014       Current Outpatient Medications   Medication Sig Dispense Refill    meclizine (ANTIVERT) 25 MG tablet Take 1 tablet (25 mg) by mouth 3 times daily as needed for dizziness. (Patient not taking: Reported on 12/10/2024) 40 tablet 0     No current facility-administered medications for this visit.       Social History     Tobacco Use    Smoking status: Never     Passive exposure: Never    Smokeless tobacco: Never   Vaping Use    Vaping status: Never Used   Substance Use Topics    Alcohol use: No    Drug use: No       ROS:  General: negative for fever  Resp: negative for chest pain   CV: negative for chest pain  ABD: as above  : negative for dysuria  Neurologic:negative for Headache    OBJECTIVE:  /67 (BP Location: Left arm, Patient Position: Sitting, Cuff Size: Adult Regular)   Pulse 63   Temp 98.3  F (36.8  C) (Tympanic)   Resp 16   Wt 57.6 kg (127 lb)   LMP 10/28/2024 (Approximate)   SpO2 98%   BMI 25.43 kg/m     General:   awake, alert, and cooperative.  NAD.   Head: Normocephalic, atraumatic.  Eyes: Conjunctiva clear, non icteric.   Heart: Regular rate and rhythm. No murmur.  Lungs: Chest is clear; no wheezes or rales.  ABD: soft, mild to moderate epigastric  tenderness to palpation , no rigidity, guarding or rebound , bowel sounds intact  Neuro: Alert and oriented - normal speech.     ASSESSMENT:    ICD-10-CM    1. Epigastric pain  R10.13       2. Nausea and vomiting, unspecified vomiting type  R11.2       3. Infection due to 2019 novel coronavirus  U07.1           PLAN: 44-year-old female, diagnosed with COVID 1 week ago presents for vomiting that started yesterday at 10 AM.  Cannot say how many times.  States now she is vomiting bile, no blood.  Says she has 9 out of 10 epigastric pain.  Has been taking daily ibuprofen and Tylenol since Thanksgiving, a total of 4 tablets daily of each.  Still coughing from COVID.  Limited on resources.  To ER for further evaluation and treatment..  Differential includes but is not limited to gastroenteritis, hepatitis, cholecystitis, peptic ulcer disease, pancreatitis, bowel obstruction, infectious etiology such as pneumonia.  Given note for today and tomorrow off.  Advised about symptoms which might herald more serious problems.        Pati Dumont PA-C

## 2025-02-06 ENCOUNTER — OFFICE VISIT (OUTPATIENT)
Dept: URGENT CARE | Facility: URGENT CARE | Age: 45
End: 2025-02-06
Payer: COMMERCIAL

## 2025-02-06 VITALS
RESPIRATION RATE: 18 BRPM | WEIGHT: 132 LBS | OXYGEN SATURATION: 98 % | TEMPERATURE: 98.3 F | BODY MASS INDEX: 26.44 KG/M2 | HEART RATE: 75 BPM | SYSTOLIC BLOOD PRESSURE: 107 MMHG | DIASTOLIC BLOOD PRESSURE: 71 MMHG

## 2025-02-06 DIAGNOSIS — R10.84 ABDOMINAL PAIN, GENERALIZED: Primary | ICD-10-CM

## 2025-02-06 ASSESSMENT — ENCOUNTER SYMPTOMS
DIARRHEA: 0
LIGHT-HEADEDNESS: 0
CARDIOVASCULAR NEGATIVE: 1
NAUSEA: 1
WEAKNESS: 0
COUGH: 0
NECK PAIN: 0
WOUND: 0
MYALGIAS: 0
DIZZINESS: 1
JOINT SWELLING: 0
NECK STIFFNESS: 0
ABDOMINAL PAIN: 1
SORE THROAT: 0
BACK PAIN: 0
FEVER: 0
CHILLS: 0
ENDOCRINE NEGATIVE: 1
RESPIRATORY NEGATIVE: 1
ARTHRALGIAS: 0
HEADACHES: 0
SHORTNESS OF BREATH: 0
MUSCULOSKELETAL NEGATIVE: 1
VOMITING: 0
ALLERGIC/IMMUNOLOGIC NEGATIVE: 1
RHINORRHEA: 0
EYES NEGATIVE: 1
PALPITATIONS: 0

## 2025-02-06 ASSESSMENT — PAIN SCALES - GENERAL: PAINLEVEL_OUTOF10: SEVERE PAIN (8)

## 2025-02-06 NOTE — PROGRESS NOTES
Chief Complaint:    Chief Complaint   Patient presents with    Abdominal Pain     Stomach pain, dizzy, nausea - started last night, started upper abdomen and now all over, no changes in urination or bowel movements      Medical Decision Making:    Vital signs reviewed by Anuj Teran PA-C  /71 (BP Location: Left arm, Patient Position: Sitting, Cuff Size: Adult Regular)   Pulse 75   Temp 98.3  F (36.8  C) (Oral)   Resp 18   Wt 59.9 kg (132 lb)   LMP 01/23/2025 (Approximate)   SpO2 98%   BMI 26.44 kg/m      Differential Diagnosis:  Abdominal Pain: GB/Cholelithiasis, GERD/Ulcer, Appendix, Diverticular Disease, Abdominal Wall, Bowel Obstruction, Pancreatitis, Non Specific, and Viral Gastroenteritis      ASSESSMENT:     1. Abdominal pain, generalized         PLAN:    Patient is in acute distress and appears in pain.  She is afebrile with stable vital signs.    She has severe tenderness with guarding in all 4 quadrants of the abdomen with palpation.   Unclear cause of her worsening pain at this time.  Patient instructed to go to the ED now for further evaluation, labs, and imaging.    Patient verbalized understanding and agreed with this plan.    Labs:     No results found for any visits on 02/06/25.    Current Meds:    Current Outpatient Medications:     meclizine (ANTIVERT) 25 MG tablet, Take 1 tablet (25 mg) by mouth 3 times daily as needed for dizziness. (Patient not taking: Reported on 2/6/2025), Disp: 40 tablet, Rfl: 0    Allergies:  Allergies   Allergen Reactions    Pcn [Penicillins]      Patient states no longer allergic (12/10/24).          SUBJECTIVE    HPI: Kevin Crowe is an 44 year old female who presents for evaluation and treatment of abdominal pain, dizziness, and nausea.  Symptoms started last night and have worsened today.  The pain is sharp in nature and is constant.  The pain started in the epigastric area and is now in all 4 quadrants of the abdomen.  She has not tried anything for the  symptoms.  She denies any fever, chills, diarrhea.  No sick contacts at home.      ROS:      Review of Systems   Constitutional:  Negative for chills and fever.   HENT:  Negative for congestion, ear pain, rhinorrhea and sore throat.    Eyes: Negative.    Respiratory: Negative.  Negative for cough and shortness of breath.    Cardiovascular: Negative.  Negative for chest pain and palpitations.   Gastrointestinal:  Positive for abdominal pain and nausea. Negative for diarrhea and vomiting.   Endocrine: Negative.    Genitourinary: Negative.    Musculoskeletal: Negative.  Negative for arthralgias, back pain, joint swelling, myalgias, neck pain and neck stiffness.   Skin: Negative.  Negative for rash and wound.   Allergic/Immunologic: Negative.  Negative for immunocompromised state.   Neurological:  Positive for dizziness. Negative for weakness, light-headedness and headaches.        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 Not Asked   Social History Narrative    Not on file     Social Drivers of Health     Financial Resource Strain: Not on file   Food Insecurity: Not on file   Transportation Needs: Not  on file   Physical Activity: Not on file   Stress: Not on file   Social Connections: Not on file   Interpersonal Safety: Not At Risk (12/10/2024)    Received [facility]     Humiliation, Afraid, Rape, and Kick questionnaire     Fear of Current or Ex-Partner: No     Emotionally Abused: No     Physically Abused: No     Sexually Abused: No   Housing Stability: Not on file        Surgical History:  Past Surgical History:   Procedure Laterality Date    GYN SURGERY  2008, 2002    C-sections        Problem List:  Patient Active Problem List   Diagnosis    Migraine without aura    Menstrual migraine    Major depressive disorder, recurrent episode, moderate (H)    Anxiety    Constipation, unspecified constipation type    CARDIOVASCULAR SCREENING; LDL GOAL LESS THAN 160      OBJECTIVE:     Vital signs noted and reviewed by Anuj Teran PA-C  /71 (BP Location: Left arm, Patient Position: Sitting, Cuff Size: Adult Regular)   Pulse 75   Temp 98.3  F (36.8  C) (Oral)   Resp 18   Wt 59.9 kg (132 lb)   LMP 01/23/2025 (Approximate)   SpO2 98%   BMI 26.44 kg/m       PEFR:    Physical Exam  Vitals and nursing note reviewed.   Constitutional:       General: She is not in acute distress.     Appearance: She is well-developed. She is not ill-appearing, toxic-appearing or diaphoretic.   HENT:      Head: Normocephalic and atraumatic.      Right Ear: Tympanic membrane and external ear normal. No drainage, swelling or tenderness. Tympanic membrane is not perforated, erythematous, retracted or bulging.      Left Ear: Tympanic membrane and external ear normal. No drainage, swelling or tenderness. Tympanic membrane is not perforated, erythematous, retracted or bulging.      Nose: No mucosal edema, congestion or rhinorrhea.      Right Sinus: No maxillary sinus tenderness or frontal sinus tenderness.      Left Sinus: No maxillary sinus tenderness or frontal sinus tenderness.      Mouth/Throat:      Pharynx: No  pharyngeal swelling, oropharyngeal exudate, posterior oropharyngeal erythema or uvula swelling.      Tonsils: No tonsillar abscesses.   Eyes:      Pupils: Pupils are equal, round, and reactive to light.   Neck:      Trachea: Trachea normal.   Cardiovascular:      Rate and Rhythm: Normal rate and regular rhythm.      Heart sounds: Normal heart sounds, S1 normal and S2 normal. No murmur heard.     No friction rub. No gallop.   Pulmonary:      Effort: Pulmonary effort is normal. No respiratory distress.      Breath sounds: Normal breath sounds. No decreased breath sounds, wheezing, rhonchi or rales.   Abdominal:      General: Bowel sounds are normal. There is no distension.      Palpations: Abdomen is soft. Abdomen is not rigid. There is no mass.      Tenderness: There is abdominal tenderness in the right upper quadrant, right lower quadrant, epigastric area, left upper quadrant and left lower quadrant. There is guarding. There is no right CVA tenderness, left CVA tenderness or rebound.   Musculoskeletal:      Cervical back: Full passive range of motion without pain, normal range of motion and neck supple.   Lymphadenopathy:      Cervical: No cervical adenopathy.   Skin:     General: Skin is warm and dry.   Neurological:      Mental Status: She is alert and oriented to person, place, and time.      Cranial Nerves: No cranial nerve deficit.      Deep Tendon Reflexes: Reflexes are normal and symmetric.   Psychiatric:         Behavior: Behavior normal. Behavior is cooperative.         Thought Content: Thought content normal.         Judgment: Judgment normal.             Anuj Teran PA-C  2/6/2025, 2:07 PM

## 2025-07-13 ENCOUNTER — HEALTH MAINTENANCE LETTER (OUTPATIENT)
Age: 45
End: 2025-07-13

## 2025-08-07 ENCOUNTER — TELEPHONE (OUTPATIENT)
Dept: FAMILY MEDICINE | Facility: CLINIC | Age: 45
End: 2025-08-07
Payer: COMMERCIAL